# Patient Record
Sex: FEMALE | Race: BLACK OR AFRICAN AMERICAN | Employment: UNEMPLOYED | URBAN - METROPOLITAN AREA
[De-identification: names, ages, dates, MRNs, and addresses within clinical notes are randomized per-mention and may not be internally consistent; named-entity substitution may affect disease eponyms.]

---

## 2017-01-09 RX ORDER — METFORMIN HYDROCHLORIDE 500 MG/1
1000 TABLET ORAL 2 TIMES DAILY WITH MEALS
Qty: 120 TAB | Refills: 0 | Status: SHIPPED | OUTPATIENT
Start: 2017-01-09 | End: 2017-03-08 | Stop reason: SDUPTHER

## 2017-01-19 RX ORDER — COLCHICINE 0.6 MG/1
TABLET ORAL
Qty: 90 TAB | Refills: 1 | Status: SHIPPED | OUTPATIENT
Start: 2017-01-19 | End: 2017-02-07

## 2017-02-07 ENCOUNTER — HOSPITAL ENCOUNTER (OUTPATIENT)
Dept: LAB | Age: 57
Discharge: HOME | End: 2017-02-07

## 2017-02-07 ENCOUNTER — OFFICE VISIT (OUTPATIENT)
Dept: FAMILY MEDICINE CLINIC | Age: 57
End: 2017-02-07

## 2017-02-07 VITALS
BODY MASS INDEX: 48.82 KG/M2 | WEIGHT: 293 LBS | HEART RATE: 81 BPM | TEMPERATURE: 98.7 F | DIASTOLIC BLOOD PRESSURE: 70 MMHG | SYSTOLIC BLOOD PRESSURE: 126 MMHG | OXYGEN SATURATION: 99 % | HEIGHT: 65 IN | RESPIRATION RATE: 16 BRPM

## 2017-02-07 VITALS
HEIGHT: 65 IN | SYSTOLIC BLOOD PRESSURE: 126 MMHG | RESPIRATION RATE: 16 BRPM | HEART RATE: 81 BPM | DIASTOLIC BLOOD PRESSURE: 70 MMHG | TEMPERATURE: 98.7 F | WEIGHT: 293 LBS | BODY MASS INDEX: 48.82 KG/M2 | OXYGEN SATURATION: 99 %

## 2017-02-07 DIAGNOSIS — I50.9 CONGESTIVE HEART FAILURE, UNSPECIFIED CONGESTIVE HEART FAILURE CHRONICITY, UNSPECIFIED CONGESTIVE HEART FAILURE TYPE: ICD-10-CM

## 2017-02-07 DIAGNOSIS — Z23 ENCOUNTER FOR IMMUNIZATION: ICD-10-CM

## 2017-02-07 DIAGNOSIS — I10 ESSENTIAL HYPERTENSION: Primary | ICD-10-CM

## 2017-02-07 DIAGNOSIS — E11.51 CONTROLLED TYPE 2 DIABETES MELLITUS WITH DIABETIC PERIPHERAL ANGIOPATHY WITHOUT GANGRENE, WITH LONG-TERM CURRENT USE OF INSULIN (HCC): ICD-10-CM

## 2017-02-07 DIAGNOSIS — Z00.00 ROUTINE GENERAL MEDICAL EXAMINATION AT A HEALTH CARE FACILITY: ICD-10-CM

## 2017-02-07 DIAGNOSIS — Z79.4 CONTROLLED TYPE 2 DIABETES MELLITUS WITH DIABETIC PERIPHERAL ANGIOPATHY WITHOUT GANGRENE, WITH LONG-TERM CURRENT USE OF INSULIN (HCC): ICD-10-CM

## 2017-02-07 DIAGNOSIS — I73.9 PERIPHERAL VASCULAR DISEASE (HCC): ICD-10-CM

## 2017-02-07 DIAGNOSIS — Z12.11 SCREEN FOR COLON CANCER: ICD-10-CM

## 2017-02-07 DIAGNOSIS — E78.00 HYPERCHOLESTEROLEMIA: ICD-10-CM

## 2017-02-07 DIAGNOSIS — Z12.31 ENCOUNTER FOR SCREENING MAMMOGRAM FOR MALIGNANT NEOPLASM OF BREAST: ICD-10-CM

## 2017-02-07 DIAGNOSIS — Z11.59 NEED FOR HEPATITIS C SCREENING TEST: ICD-10-CM

## 2017-02-07 PROCEDURE — 99001 SPECIMEN HANDLING PT-LAB: CPT | Performed by: NURSE PRACTITIONER

## 2017-02-07 RX ORDER — GABAPENTIN 300 MG/1
300 CAPSULE ORAL 3 TIMES DAILY
COMMUNITY

## 2017-02-07 RX ORDER — TIMOLOL MALEATE 5 MG/ML
1 SOLUTION OPHTHALMIC DAILY
COMMUNITY

## 2017-02-07 NOTE — PATIENT INSTRUCTIONS
Medicare Wellness Visit, Female    The best way to live healthy is to have a healthy lifestyle by eating a well-balanced diet, exercising regularly, limiting alcohol and stopping smoking. Regular physical exams and screening tests are another way to keep healthy. Preventive exams provided by your health care provider can find health problems before they become diseases or illnesses. Preventive services including immunizations, screening tests, monitoring and exams can help you take care of your own health. All people over age 72 should have a pneumovax  and and a prevnar shot to prevent pneumonia. These are once in a lifetime unless you and your provider decide differently. All people over 65 should have a yearly flu shot and a tetanus vaccine every 10 years. A bone mass density to screen for osteoporosis or thinning of the bones should be done every 2 years after 65. Screening for diabetes mellitus with a blood sugar test should be done every year. Glaucoma is a disease of the eye due to increased ocular pressure that can lead to blindness and it should be done every year by an eye professional.    Cardiovascular screening tests that check for elevated lipids (fatty part of blood) which can lead to heart disease and strokes should be done every 5 years. Colorectal screening that evaluates for blood or polyps in your colon should be done yearly as a stool test or every five years as a flexible sigmoidoscope or every 10 years as a colonoscopy up to age 76. Breast cancer screening with a mammogram is recommended biennially  for women age 54-69. Screening for cervical cancer with a pap smear and pelvic exam is recommended for women after age 72 years every 2 years up to age 79 or when the provider and patient decide to stop. If there is a history of cervical abnormalities or other increased risk for cancer then the test is recommended yearly.     Hepatitis C screening is also recommended for anyone born between [facility] [place] [facility]. A shingles vaccine is also recommended once in a lifetime after age 61. Your Medicare Wellness Exam is recommended annually. Here is a list of your current Health Maintenance items with a due date:  Health Maintenance Due   Topic Date Due    Hepatitis C Test  1960 - ordered today ( 2/7/17)    Pneumococcal Vaccine (1 of 1 - PPSV23) 08/23/1979 - given today ( 2/7/17)    DTaP/Tdap/Td  (1 - Tdap) 08/23/1981 - given today ( 2/7/17)    Diabetic Foot Care  09/16/2015    Hemoglobin A1C    12/24/2015    Cervical Cancer Screening  05/05/2016 - pap to be scheduled with own GYN    Albumin Urine Test  06/24/2016    Cholesterol Test   06/24/2016    Stool testing for trace blood  06/24/2016 - ordered today (2/7/17)     Eye Exam  09/05/2016       Vaccine Information Statement    Pneumococcal Polysaccharide Vaccine: What You Need to Know    Many Vaccine Information Statements are available in Swedish and other languages. See www.immunize.org/vis. Hojas de información Sobre Vacunas están disponibles en español y en muchos otros idiomas. Visite [email or web address]. 1. Why get vaccinated? Vaccination can protect older adults (and some children and younger adults) from pneumococcal disease. Pneumococcal disease is caused by bacteria that can spread from person to person through close contact. It can cause ear infections, and it can also lead to more serious infections of the:   Lungs (pneumonia),   Blood (bacteremia), and   Covering of the brain and spinal cord (meningitis). Meningitis can cause deafness and brain damage, and it can be fatal.      Anyone can get pneumococcal disease, but children under 3years of age, people with certain medical conditions, adults over 72years of age, and cigarette smokers are at the highest risk. About 18,000 older adults die each year from pneumococcal disease in the [place].     Treatment of pneumococcal infections with penicillin and other drugs used to be more effective. But some strains of the disease have become resistant to these drugs. This makes prevention of the disease, through vaccination, even more important. 2. Pneumococcal polysaccharide vaccine (PPSV23)    Pneumococcal polysaccharide vaccine (PPSV23) protects against 23 types of pneumococcal bacteria. It will not prevent all pneumococcal disease. PPSV23 is recommended for:   All adults 72years of age and older,   Anyone 2 through 59years of age with certain long-term health problems,   Anyone 2 through 59years of age with a weakened immune system,   Adults 23 through 59years of age who smoke cigarettes or have asthma. Most people need only one dose of PPSV. A second dose is recommended for certain high-risk groups. People 72 and older should get a dose even if they have gotten one or more doses of the vaccine before they turned 65. Your healthcare provider can give you more information about these recommendations. Most healthy adults develop protection within 2 to 3 weeks of getting the shot. 3. Some people should not get this vaccine     Anyone who has had a life-threatening allergic reaction to PPSV should not get another dose.  Anyone who has a severe allergy to any component of PPSV should not receive it. Tell your provider if you have any severe allergies.  Anyone who is moderately or severely ill when the shot is scheduled may be asked to wait until they recover before getting the vaccine. Someone with a mild illness can usually be vaccinated.  Children less than 3years of age should not receive this vaccine.  There is no evidence that PPSV is harmful to either a pregnant woman or to her fetus. However, as a precaution, women who need the vaccine should be vaccinated before becoming pregnant, if possible.     4. Risks of a vaccine reaction    With any medicine, including vaccines, there is a chance of side effects. These are usually mild and go away on their own, but serious reactions are also possible. About half of people who get PPSV have mild side effects, such as redness or pain where the shot is given, which go away within about two days. Less than 1 out of 100 people develop a fever, muscle aches, or more severe local reactions. Problems that could happen after any vaccine:     People sometimes faint after a medical procedure, including vaccination. Sitting or lying down for about 15 minutes can help prevent fainting, and injuries caused by a fall. Tell your doctor if you feel dizzy, or have vision changes or ringing in the ears.  Some people get severe pain in the shoulder and have difficulty moving the arm where a shot was given. This happens very rarely.  Any medication can cause a severe allergic reaction. Such reactions from a vaccine are very rare, estimated at about 1 in a million doses, and would happen within a few minutes to a few hours after the vaccination. As with any medicine, there is a very remote chance of a vaccine causing a serious injury or death. The safety of vaccines is always being monitored. For more information, visit: www.cdc.gov/vaccinesafety/     5. What if there is a serious reaction? What should I look for? Look for anything that concerns you, such as signs of a severe allergic reaction, very high fever, or unusual behavior. Signs of a severe allergic reaction can include hives, swelling of the face and throat, difficulty breathing, a fast heartbeat, dizziness, and weakness. These would usually start a few minutes to a few hours after the vaccination. What should I do? If you think it is a severe allergic reaction or other emergency that cant wait, call 9-1-1 or get to the nearest hospital. Otherwise, call your doctor. Afterward, the reaction should be reported to the Vaccine Adverse Event Reporting System (VAERS).  Your doctor might file this report, or you can do it yourself through the [employer] web site at www.[employer]. [place] [address].gov, or by calling 6-925.618.5023. [employer] does not give medical advice. 6. How can I learn more?  Ask your doctor. He or she can give you the vaccine package insert or suggest other sources of information.  Call your local or state health department.  Contact the Centers for Disease Control and Prevention (CDC):  - Call 3-894.158.5335 (1-800-CDC-INFO) or  - Visit [facility]s website at [email or web address] 2015    [facility] for Disease Control and Prevention    Office Use Only    Vaccine Information Statement     Tdap (Tetanus, Diphtheria, Pertussis) Vaccine: What You Need to Know    Many Vaccine Information Statements are available in Bhutanese and other languages. See www.immunize.org/vis. Hojas de Información Sobre Vacunas están disponibles en español y en muchos otros idiomas. Visite [email or web address]    1. Why get vaccinated? Tetanus, diphtheria, and pertussis are very serious diseases. Tdap vaccine can protect us from these diseases. And, Tdap vaccine given to pregnant women can protect  babies against pertussis. TETANUS (Lockjaw) is rare in the [facility] today. It causes painful muscle tightening and stiffness, usually all over the body.  It can lead to tightening of muscles in the head and neck so you cant open your mouth, swallow, or sometimes even breathe. Tetanus kills about 1 out of 10 people who are infected even after receiving the best medical care. DIPHTHERIA is also rare in the [facility] today. It can cause a thick coating to form in the back of the throat.  It can lead to breathing problems, heart failure, paralysis, and death. PERTUSSIS (Whooping Cough) causes severe coughing spells, which can cause difficulty breathing, vomiting, and disturbed sleep.    It can also lead to weight loss, incontinence, and rib fractures. Up to 2 in 100 adolescents and 5 in 100 adults with pertussis are hospitalized or have complications, which could include pneumonia or death. These diseases are caused by bacteria. Diphtheria and pertussis are spread from person to person through secretions from coughing or sneezing. Tetanus enters the body through cuts, scratches, or wounds. Before vaccines, as many as 200,000 cases of diphtheria, 200,000 cases of pertussis, and hundreds of cases of tetanus, were reported in the [place] each year. Since vaccination began, reports of cases for tetanus and diphtheria have dropped by about 99% and for pertussis by about 80%. 2. Tdap vaccine    Tdap vaccine can protect adolescents and adults from tetanus, diphtheria, and pertussis. One dose of Tdap is routinely given at age 6 or 15. People who did not get Tdap at that age should get it as soon as possible. Tdap is especially important for health care professionals and anyone having close contact with a baby younger than 12 months. Pregnant women should get a dose of Tdap during every pregnancy, to protect the  from pertussis. Infants are most at risk for severe, life-threatening complications from pertussis. Another vaccine, called Td, protects against tetanus and diphtheria, but not pertussis. A Td booster should be given every 10 years. Tdap may be given as one of these boosters if you have never gotten Tdap before. Tdap may also be given after a severe cut or burn to prevent tetanus infection. Your doctor or the person giving you the vaccine can give you more information. Tdap may safely be given at the same time as other vaccines.     3. Some people should not get this vaccine     A person who has ever had a life-threatening allergic reaction after a previous dose of any diphtheria, tetanus or pertussis containing vaccine, OR has a severe allergy to any part of this vaccine, should not get Tdap vaccine. Tell the person giving the vaccine about any severe allergies.  Anyone who had coma or long repeated seizures within 7 days after a childhood dose of DTP or DTaP, or a previous dose of Tdap, should not get Tdap, unless a cause other than the vaccine was found. They can still get Td.  Talk to your doctor if you:  - have seizures or another nervous system problem,  - had severe pain or swelling after any vaccine containing diphtheria, tetanus or pertussis,   - ever had a condition called Guillain Barré Syndrome (GBS),  - arent feeling well on the day the shot is scheduled. 4. Risks    With any medicine, including vaccines, there is a chance of side effects. These are usually mild and go away on their own. Serious reactions are also possible but are rare. Most people who get Tdap vaccine do not have any problems with it.     Mild Problems following Tdap  (Did not interfere with activities)   Pain where the shot was given (about 3 in 4 adolescents or 2 in 3 adults)   Redness or swelling where the shot was given (about 1 person in 5)   Mild fever of at least 100.4°F (up to about 1 in 25 adolescents or 1 in 100 adults)   Headache (about 3 or 4 people in 10)   Tiredness (about 1 person in 3 or 4)   Nausea, vomiting, diarrhea, stomach ache (up to 1 in 4 adolescents or 1 in 10 adults)   Chills,  sore joints (about 1 person in 10)   Body aches (about 1 person in 3 or 4)    Rash, swollen glands (uncommon)    Moderate Problems following Tdap  (Interfered with activities, but did not require medical attention)   Pain where the shot was given (up to 1 in 5 or 6)    Redness or swelling where the shot was given (up to about 1 in 16 adolescents or 1 in 12 adults)   Fever over 102°F (about 1 in 100 adolescents or 1 in 250 adults)   Headache (about 1 in 7 adolescents or 1 in 10 adults)   Nausea, vomiting, diarrhea, stomach ache (up to 1 or 3 people in 100)   Swelling of the entire arm where the shot was given (up to about 1 in 500). Severe Problems following Tdap  (Unable to perform usual activities; required medical attention)   Swelling, severe pain, bleeding, and redness in the arm where the shot was given (rare). Problems that could happen after any vaccine:     People sometimes faint after a medical procedure, including vaccination. Sitting or lying down for about 15 minutes can help prevent fainting, and injuries caused by a fall. Tell your doctor if you feel dizzy, or have vision changes or ringing in the ears.  Some people get severe pain in the shoulder and have difficulty moving the arm where a shot was given. This happens very rarely.  Any medication can cause a severe allergic reaction. Such reactions from a vaccine are very rare, estimated at fewer than 1 in a million doses, and would happen within a few minutes to a few hours after the vaccination. As with any medicine, there is a very remote chance of a vaccine causing a serious injury or death. The safety of vaccines is always being monitored. For more information, visit: www.cdc.gov/vaccinesafety/    5. What if there is a serious problem? What should I look for?  Look for anything that concerns you, such as signs of a severe allergic reaction, very high fever, or unusual behavior.  Signs of a severe allergic reaction can include hives, swelling of the face and throat, difficulty breathing, a fast heartbeat, dizziness, and weakness. These would usually start a few minutes to a few hours after the vaccination. What should I do?  If you think it is a severe allergic reaction or other emergency that cant wait, call 9-1-1 or get the person to the nearest hospital. Otherwise, call your doctor.  Afterward, the reaction should be reported to the Vaccine Adverse Event Reporting System (VAERS).  Your doctor might file this report, or you can do it yourself through the VAERS web site at www.vaers. [facility].gov, or by calling 0-312.224.6330. VAERS does not give medical advice. 6. The National Vaccine Injury Compensation Program    The [facility] Vaccine Injury Compensation Program (VICP) is a federal program that was created to compensate people who may have been injured by certain vaccines. Persons who believe they may have been injured by a vaccine can learn about the program and about filing a claim by calling 3-860.900.6688 or visiting the [employer] website at www.[facility].gov/vaccinecompensation. There is a time limit to file a claim for compensation. 7. How can I learn more?  Ask your doctor. He or she can give you the vaccine package insert or suggest other sources of information.  Call your local or state health department.  Contact the Centers for Disease Control and Prevention (CDC):  - Call 6-276.910.1655 (1-800-CDC-INFO) or  - Visit CDCs website at www.cdc.gov/vaccines      Vaccine Information Statement   Tdap Vaccine  (2/24/2015)  [id number]    Department of Health and Human Services  Centers for Disease Control and Prevention    Office Use Only

## 2017-02-07 NOTE — PROGRESS NOTES
Matt Camejo is a 64 y.o. female presents to office for [address]       1. Have you been to the ER, urgent care clinic or hospitalized since your last visit? no  2. Have you seen any other providers outside of [facility] [employer] since your last visit? no  3. Have you had a Flu shot this year?  Yes        Health Maintenance items with a due date reviewed with patient:  Health Maintenance Due   Topic Date Due    Hepatitis C Screening  1960    Pneumococcal 19-64 Medium Risk (1 of 1 - PPSV23) 08/23/1979    DTaP/Tdap/Td series (1 - Tdap) 08/23/1981    FOOT EXAM Q1  09/16/2015    HEMOGLOBIN A1C Q6M  12/24/2015    PAP AKA CERVICAL CYTOLOGY  05/05/2016    MICROALBUMIN Q1  06/24/2016    LIPID PANEL Q1  06/24/2016    FOBT Q 1 YEAR AGE 50-75  06/24/2016    EYE EXAM RETINAL OR DILATED Q1  09/05/2016

## 2017-02-07 NOTE — PROGRESS NOTES
Barb Morelos is a 64 y.o. female presents to office for CAD, DM and PAD       1. Have you been to the ER, urgent care clinic or hospitalized since your last visit? No  2. Have you seen any other providers outside of [facility] since your last visit? No  3. Have you had a Flu shot this year?  Yes        Health Maintenance items with a due date reviewed with patient:  Health Maintenance Due   Topic Date Due    Hepatitis C Screening  1960    Pneumococcal 19-64 Medium Risk (1 of 1 - PPSV23) 08/23/1979    DTaP/Tdap/Td series (1 - Tdap) 08/23/1981    FOOT EXAM Q1  09/16/2015    HEMOGLOBIN A1C Q6M  12/24/2015    PAP AKA CERVICAL CYTOLOGY  05/05/2016    MICROALBUMIN Q1  06/24/2016    LIPID PANEL Q1  06/24/2016    FOBT Q 1 YEAR AGE 50-75  06/24/2016    INFLUENZA AGE 9 TO ADULT  08/01/2016    EYE EXAM RETINAL OR DILATED Q1  09/05/2016

## 2017-02-07 NOTE — PROGRESS NOTES
This is a Subsequent Medicare Annual Wellness Visit providing Personalized Prevention Plan Services (PPPS) (Performed 12 months after initial AWV and PPPS )    I have reviewed the patient's medical history in detail and updated the computerized patient record. Patient presents for medicare wellness visit. No significant complaints made. History     Past Medical History   Diagnosis Date    Anemia NEC     Arthritis     Chronic kidney disease     Congestive heart failure, unspecified      L[place]isha    CRI (chronic renal insufficiency)      Zelesky    Diabetes ([facility] [address]      eye- Lance Repress    Fibromyalgia     Gout     H/O colonoscopy     H/O mammogram      5/2013    History of blood transfusion     Hypercholesterolemia     Hypertension     PCOS (polycystic ovarian syndrome)     Peripheral vascular disease ([facility] [address]     Routine gynecological examination      Aaliyah [place]- May 2013    Stenosis of lumbosacral spine     Stroke [facility]) 1991     had dysarthria    Vitamin D deficiency       Past Surgical History   Procedure Laterality Date    Hx tonsillectomy  1981    Hx ptca  April 27, 2012    Stent insertion Right 1-     right iliac    Bryan us asp breast cyst rt       Current Outpatient Prescriptions   Medication Sig Dispense Refill    CARVEDILOL (COREG PO) Take 50 mg by mouth two (2) times a day.  Liraglutide (VICTOZA) 0.6 mg/0.1 mL (18 mg/3 mL) sub-q pen 1.8 mg by SubCUTAneous route.  gabapentin (NEURONTIN) 300 mg capsule Take 300 mg by mouth three (3) times daily.  timolol (TIMOPTIC-XE) 0.5 % ophthalmic gel-forming Administer 1 Drop to both eyes daily.  NIFEdipine ER (PROCARDIA XL) 90 mg ER tablet take 1 tablet by mouth once daily 30 Tab 0    metFORMIN (GLUCOPHAGE) 500 mg tablet Take 2 Tabs by mouth two (2) times daily (with meals).  120 Tab 0    simvastatin (ZOCOR) 40 mg tablet take 1 tablet by mouth NIGHTLY 90 Tab 1    clopidogrel (PLAVIX) 75 mg tab take 1 tablet by mouth once daily 90 Tab 1    losartan (COZAAR) 100 mg tablet take 1 tablet by mouth once daily 90 Tab 0    spironolactone (ALDACTONE) 25 mg tablet take 1 tablet by mouth once daily 90 Tab 1    allopurinol (ZYLOPRIM) 300 mg tablet take 1 tablet by mouth once daily 90 Tab 0    INVOKANA 300 mg tablet take 1 tablet by mouth once daily BEFORE BREAKFAST 30 Tab 3    LANTUS SOLOSTAR 100 unit/mL (3 mL) pen INJECT 30 UNITS SUBCUTANEOUSLY ONCE A DAY 5 Each 6    DULoxetine (CYMBALTA) 60 mg capsule take 1 capsule by mouth once daily 90 Cap 1    furosemide (LASIX) 20 mg tablet take 1 tablet by mouth once daily 90 Tab 1    BD INSULIN PEN NEEDLE UF SHORT 31 gauge x 5/16\" ndle USE TO INJECT ONE TIME DAILY WITH INSULIN PEN 90 Package 0    BD SINGLE USE SWABS REGULAR padm USE AS DIRECTED 300 Pad 2    isosorbide mononitrate ER (IMDUR) 60 mg CR tablet take 1 tablet by mouth every morning 30 Tab 0    Lancets (ACCU-CHEK MULTICLIX LANCET) misc USE TO TEST BLOOD GLUCOSE FOUR TIMES DAILY 400 Each 1    glucose blood VI test strips (ACCU-CHEK SMARTVIEW TEST STRIP) strip USE TO TEST BLOOD SUGAR FOUR TIMES DAILY 300 Strip 0    aspirin (BABY ASPIRIN) 81 mg chewable tablet Take 1 tablet by mouth daily. 90 tablet 3    Lancets (PRODIGY LANCETS) misc Tests qid 250.02 3 Package 3    multivitamin (ONE A DAY) tablet Take 1 tablet by mouth daily.        Allergies   Allergen Reactions    Other Plant, Animal, Environmental Runny Nose and Sneezing     Family History   Problem Relation Age of Onset    Diabetes Mother     Hypertension Mother     Dementia Mother     Other Mother      aneurysm     Cancer Father      LUNG    Colon Cancer Sister          Diabetes Sister     Hypertension Sister     Diabetes Brother     Hypertension Brother      Social History   Substance Use Topics    Smoking status: Former Smoker     Packs/day: 1.00     Years: 7.00     Types: Cigarettes     Quit date: 10/1/1981    Smokeless tobacco: Never Used   [employer] [place] Alcohol use No     Patient Active Problem List   Diagnosis Code    CRI (chronic renal insufficiency) N18.9    Hypertension I10    Congestive heart failure, unspecified I50.9    Anemia NEC     PCOS (polycystic ovarian syndrome) E28.2    Arthritis M19.90    Stenosis of lumbosacral spine M48.07    Peripheral vascular disease (HCC) I73.9    Hypercholesterolemia E78.00    Type II or unspecified type diabetes mellitus without mention of complication, uncontrolled E11.65    Gout M10.9    Fibromyalgia M79.7    DJD (degenerative joint disease) of knee M17.9    Tachycardia R00.0    CHF (congestive heart failure) (HCC) I50.9       Depression Risk Factor Screening:     PHQ 2 / 9, over the last two weeks 2/7/2017   Little interest or pleasure in doing things Not at all   Feeling down, depressed or hopeless Not at all   Total Score PHQ 2 0     Alcohol Risk Factor Screening: On any occasion during the past 3 months, have you had more than 3 drinks containing alcohol? No    Do you average more than 7 drinks per week? No      Functional Ability and Level of Safety:     Hearing Loss   none    Activities of Daily Living   Self-care. Requires assistance with: no ADLs    Fall Risk     Fall Risk Assessment, last 12 mths 2/7/2017   Able to walk? Yes   Fall in past 12 months? No   Fall with injury? -   Number of falls in past 12 months -   Fall Risk Score -     Abuse Screen   Patient is not abused    Review of Systems   A comprehensive review of systems was negative except for that written in the HPI. Physical Examination     Evaluation of Cognitive Function:  Mood/affect:  happy  Appearance: age appropriate  Family member/caregiver input: none    No exam performed today, MWV.     Patient Care Team:  Erin Traore MD as PCP - General (Internal Medicine)   Mike Grewal MD - rheumatology  Fili Renteria MD - ophthalmology  Wilman Payton MD - cardiology  [facility] Vascular Specialist        Advice/Referrals/Counseling   Education and counseling provided:  Pneumococcal Vaccine   Tdap Vaccine  Heapatitis C Screening  Colon Cancer Screening  Breast Cancer Screening      Pap due, preferred to go to OB-Gyn  Colonoscopy not due until 2018  Fecal Occult Blood - last done on 6/24/15  Last mammogram -9/2/15    Assessment/Plan       ICD-10-CM ICD-9-CM    1. Routine general medical examination at a health care facility Z00.00 V70.0        2. Need for hepatitis C screening test Z11.59 V73.89 HEPATITIS C AB     3. Screen for colon cancer Z12.11 V76.51 OCCULT BLOOD, IMMUNOASSAY (FIT)     4. Encounter for immunization Z23 V03.89 ADMIN PNEUMOCOCCAL VACCINE      PNEUMOCOCCAL POLYSACCHARIDE VACCINE, 23-VALENT, ADULT OR IMMUNOSUPPRESSED PT DOSE,        TETANUS, DIPHTHERIA TOXOIDS AND ACELLULAR PERTUSSIS VACCINE (TDAP), IN INDIVIDS. >=7, IM     5. Encounter for screening mammogram for malignant neoplasm of breast Z12.31 V76.12 SKYLA MAMMO BI SCREENING INCL CAD       Medicare Wellness Visit Schedule discussed. Follow-up Disposition:  Return in about 1 year (around 2/7/2018) for subsequent MWV. Dylon Johnson, ANP-BC  Adult Medicine  [facility]

## 2017-02-07 NOTE — PROGRESS NOTES
HISTORY OF PRESENT ILLNESS  Comfort Keller is a 64 y.o. female here for follow-up on diabetes hypertension hyperlipidemia CHF and PAD. Patient also has history of gout but now is off of colchicine and has had no further episodes. Patient has had another stent placed in right leg for PAD since last seen. Fasting blood sugars are in the 90s-118 and postprandial blood sugars are 140-180. Overall patient is doing well and has no complaints today  CHF   The history is provided by the patient and medical records. This is a chronic problem. The problem has been gradually improving. Pertinent negatives include no chest pain, no abdominal pain, no headaches and no shortness of breath. Nothing aggravates the symptoms. The symptoms are relieved by medications. Treatments tried: Coreg and Lasix. The treatment provided significant relief. Diabetes   The history is provided by the patient and medical records. This is a chronic problem. The problem has been gradually improving. Pertinent negatives include no chest pain, no abdominal pain, no headaches and no shortness of breath. The symptoms are aggravated by eating. The symptoms are relieved by medications. Treatments tried: Victoza metformin and Invokana. The treatment provided significant relief. Other   The history is provided by the patient and medical records (Patient has history of PAD). This is a chronic problem. The problem has been gradually improving. Pertinent negatives include no chest pain, no abdominal pain, no headaches and no shortness of breath. Nothing aggravates the symptoms. Relieved by: Stent placement. Treatments tried: Stent placement. The treatment provided significant relief. Cholesterol Problem   The history is provided by the patient and medical records. This is a chronic problem. The problem has been gradually improving. Pertinent negatives include no chest pain, no abdominal pain, no headaches and no shortness of breath.  The symptoms are aggravated by eating. The symptoms are relieved by medications. Treatments tried: Zocor. The treatment provided significant relief. Hypertension    The history is provided by the patient and medical records. This is a chronic problem. The problem has been gradually improving. Pertinent negatives include no chest pain, no orthopnea, no headaches, no peripheral edema and no shortness of breath. Risk factors include dyslipidemia, diabetes mellitus, obesity and postmenopause. Allergies   Allergen Reactions    Other Plant, Animal, Environmental Runny Nose and Sneezing     Current Outpatient Prescriptions on File Prior to Visit   Medication Sig Dispense Refill    NIFEdipine ER (PROCARDIA XL) 90 mg ER tablet take 1 tablet by mouth once daily 30 Tab 0    metFORMIN (GLUCOPHAGE) 500 mg tablet Take 2 Tabs by mouth two (2) times daily (with meals).  120 Tab 0    simvastatin (ZOCOR) 40 mg tablet take 1 tablet by mouth NIGHTLY 90 Tab 1    clopidogrel (PLAVIX) 75 mg tab take 1 tablet by mouth once daily 90 Tab 1    losartan (COZAAR) 100 mg tablet take 1 tablet by mouth once daily 90 Tab 0    spironolactone (ALDACTONE) 25 mg tablet take 1 tablet by mouth once daily 90 Tab 1    allopurinol (ZYLOPRIM) 300 mg tablet take 1 tablet by mouth once daily 90 Tab 0    INVOKANA 300 mg tablet take 1 tablet by mouth once daily BEFORE BREAKFAST 30 Tab 3    LANTUS SOLOSTAR 100 unit/mL (3 mL) pen INJECT 30 UNITS SUBCUTANEOUSLY ONCE A DAY 5 Each 6    DULoxetine (CYMBALTA) 60 mg capsule take 1 capsule by mouth once daily 90 Cap 1    furosemide (LASIX) 20 mg tablet take 1 tablet by mouth once daily 90 Tab 1    BD INSULIN PEN NEEDLE UF SHORT 31 gauge x 5/16\" ndle USE TO INJECT ONE TIME DAILY WITH INSULIN PEN 90 Package 0    BD SINGLE USE SWABS REGULAR padm USE AS DIRECTED 300 Pad 2    isosorbide mononitrate ER (IMDUR) 60 mg CR tablet take 1 tablet by mouth every morning 30 Tab 0    Lancets (ACCU-CHEK MULTICLIX LANCET) misc USE TO TEST BLOOD GLUCOSE FOUR TIMES DAILY 400 Each 1    glucose blood VI test strips (ACCU-CHEK SMARTVIEW TEST STRIP) strip USE TO TEST BLOOD SUGAR FOUR TIMES DAILY 300 Strip 0    aspirin (BABY ASPIRIN) 81 mg chewable tablet Take 1 tablet by mouth daily. 90 tablet 3    Lancets (PRODIGY LANCETS) misc Tests qid 250.02 3 Package 3    multivitamin (ONE A DAY) tablet Take 1 tablet by mouth daily. No current facility-administered medications on file prior to visit. Past Medical History   Diagnosis Date    Anemia NEC     Arthritis     Chronic kidney disease     Congestive heart failure, unspecified      [place]isha    CRI (chronic renal insufficiency)      Zelesky    Diabetes ([facility] [address]      eye- Joel Nestor    Fibromyalgia     Gout     H/O colonoscopy     H/O mammogram      2013    History of blood transfusion     Hypercholesterolemia     Hypertension     PCOS (polycystic ovarian syndrome)     Peripheral vascular disease ([facility] [address]     Routine gynecological examination      [facility]- [facility] 2013    Stenosis of lumbosacral spine     Stroke [facility]) 1991     had dysarthria    Vitamin D deficiency      Past Surgical History   Procedure Laterality Date    Hx tonsillectomy      Hx ptca  2012    Stent insertion Right 2012     right iliac    Bryan us asp breast cyst rt       Family History   Problem Relation Age of Onset    Diabetes Mother     Hypertension Mother     Dementia Mother     Other Mother      aneurysm     Cancer Father      LUNG    Colon Cancer Sister          Diabetes Sister     Hypertension Sister     Diabetes Brother     Hypertension Brother      Social History     Social History    Marital status: SINGLE     Spouse name: N/A    Number of children: N/A    Years of education: N/A     Occupational History    Not on file.      Social History Main Topics    Smoking status: Former Smoker     Packs/day: 1.00     Years: 7.00     Types: Cigarettes     Quit date: 10/1/1981    Smokeless tobacco: Never Used    Alcohol use No    Drug use: No    Sexual activity: Not Currently     Other Topics Concern     Service No    Blood Transfusions Yes     2010    Caffeine Concern No    Occupational Exposure No    Hobby Hazards No    Sleep Concern No    Stress Concern No    Weight Concern Yes     WOULD LIKE TO LOSE WEIGHT AND MAINTAIN WEIGHT LOSS    Special Diet No    Back Care No    Exercise Yes     SOMETIMES    Bike Helmet No    Seat Belt Yes    Self-Exams Yes     Social History Narrative         Review of Systems   Constitutional: Negative. Respiratory: Negative. Negative for shortness of breath. Cardiovascular: Negative. Negative for chest pain and orthopnea. Gastrointestinal: Negative for abdominal pain. Musculoskeletal: Negative. Neurological: Negative. Negative for headaches. Endo/Heme/Allergies: Negative. Psychiatric/Behavioral: Negative. Visit Vitals    /70 (BP 1 Location: Left arm, BP Patient Position: Sitting)    Pulse 81    Temp 98.7 °F (37.1 °C) (Oral)    Resp 16    Ht 5' 4.5\" (1.638 m)    Wt 321 lb (145.6 kg)    LMP 01/04/2017    SpO2 99%    BMI 54.25 kg/m2       Physical Exam   Constitutional: She is oriented to person, place, and time. She appears well-developed and well-nourished. HENT:   Head: Normocephalic and atraumatic. Cardiovascular: Normal rate, regular rhythm, normal heart sounds and intact distal pulses. Exam reveals no gallop and no friction rub. No murmur heard. Pulmonary/Chest: Effort normal and breath sounds normal. No respiratory distress. She has no wheezes. She has no rales. Musculoskeletal: She exhibits no edema. Neurological: She is alert and oriented to person, place, and time. No cranial nerve deficit. Psychiatric: She has a normal mood and affect. Her behavior is normal. Judgment and thought content normal.   Nursing note and vitals reviewed.       ASSESSMENT and PLAN ICD-10-CM ICD-9-CM    1. Essential hypertension U05 152.4 METABOLIC PANEL, COMPREHENSIVE      URINALYSIS W/ RFLX MICROSCOPIC   2. Congestive heart failure, unspecified congestive heart failure chronicity, unspecified congestive heart failure type ([facility]) U74.3 014.7 METABOLIC PANEL, COMPREHENSIVE   3. Controlled type 2 diabetes mellitus with diabetic peripheral angiopathy without gangrene, with long-term current use of insulin ([facility]) E11.51 250.70 HEMOGLOBIN A1C WITH EAG    Z79.4 443.81 CBC WITH AUTOMATED DIFF     T11.59 METABOLIC PANEL, COMPREHENSIVE      MICROALBUMIN, UR, RAND W/ MICROALBUMIN/CREA RATIO      URINALYSIS W/ RFLX MICROSCOPIC   4. Hypercholesterolemia M17.14 049.9 METABOLIC PANEL, COMPREHENSIVE      LIPID PANEL   5. Peripheral vascular disease (HCC) I73.9 443.9 LIPID PANEL     Follow-up Disposition:  Return in about 4 weeks (around 3/7/2017).   current treatment plan is effective, no change in therapy

## 2017-02-07 NOTE — MR AVS SNAPSHOT
Visit Information Date & Time Provider Department Dept. Phone Encounter #  
 2/7/2017  8:45 AM Luz Valle MD [facility] [phone] [id number] Follow-up Instructions Return in about 4 weeks (around 3/7/2017). Upcoming Health Maintenance Date Due Hepatitis C Screening 1960 FOOT EXAM Q1 9/16/2015 HEMOGLOBIN A1C Q6M 12/24/2015 PAP AKA CERVICAL CYTOLOGY 5/5/2016 MICROALBUMIN Q1 6/24/2016 LIPID PANEL Q1 6/24/2016 FOBT Q 1 YEAR AGE 50-75 6/24/2016 EYE EXAM RETINAL OR DILATED Q1 9/5/2016 BREAST CANCER SCRN MAMMOGRAM 9/2/2017 DTaP/Tdap/Td series (2 - Td) 2/7/2027 Allergies as of 2/7/2017  Review Complete On: 2/7/2017 By: JOSH Atkins Severity Noted Reaction Type Reactions Other Plant, Animal, Environmental  06/24/2015    Runny Nose, Sneezing Current Immunizations  Reviewed on 2/7/2017 Name Date Influenza Vaccine 9/16/2014 11:53 AM, 12/30/2013  8:50 AM, 11/10/2011, 10/12/2010 Pneumococcal Polysaccharide (PPSV-23) 2/7/2017, 10/12/2010 Tdap 2/7/2017 Reviewed by JOSH Atkins on 2/7/2017 at  9:33 AM  
You Were Diagnosed With   
  
 Codes Comments Essential hypertension    -  Primary ICD-10-CM: I10 
ICD-9-CM: 401.9 Congestive heart failure, unspecified congestive heart failure chronicity, unspecified congestive heart failure type ([facility]     ICD-10-CM: I50.9 ICD-9-CM: 428.0 Controlled type 2 diabetes mellitus with diabetic peripheral angiopathy without gangrene, with long-term current use of insulin (HCC)     ICD-10-CM: E11.51, Z79.4 ICD-9-CM: 250.70, 443.81, V58.67 Hypercholesterolemia     ICD-10-CM: E78.00 ICD-9-CM: 272.0 Peripheral vascular disease ([facility]     ICD-10-CM: I73.9 ICD-9-CM: 443. 9 Vitals BP Pulse Temp Resp Height(growth percentile) Weight(growth percentile) 126/70 (BP 1 Location: Left arm, BP Patient Position: Sitting) 81 98.7 °F (37.1 °C) (Oral) 16 5' 4.5\" (1.638 m) 321 lb (145.6 kg) LMP SpO2 BMI OB Status Smoking Status 01/04/2017 99% 54.25 kg/m2 Having regular periods Former Smoker Vitals History BMI and BSA Data Body Mass Index Body Surface Area  
 54.25 kg/m 2 2.57 m 2 Preferred Pharmacy Pharmacy Name Phone [id number] [address] [phone] Your Updated Medication List  
  
   
This list is accurate as of: 2/7/17 10:23 AM.  Always use your most recent med list.  
  
  
  
  
 allopurinol 300 mg tablet Commonly known as:  ZYLOPRIM  
take 1 tablet by mouth once daily  
  
 aspirin 81 mg chewable tablet Commonly known as:  BABY ASPIRIN Take 1 tablet by mouth daily. BD INSULIN PEN NEEDLE UF SHORT 31 gauge x 5/16\" Ndle Generic drug:  Insulin Needles (Disposable) USE TO INJECT ONE TIME DAILY WITH INSULIN PEN  
  
 BD Single Use Swabs Regular Padm Generic drug:  alcohol swabs USE AS DIRECTED  
  
 clopidogrel 75 mg Tab Commonly known as:  PLAVIX  
take 1 tablet by mouth once daily COREG PO Take 50 mg by mouth two (2) times a day. DULoxetine 60 mg capsule Commonly known as:  CYMBALTA  
take 1 capsule by mouth once daily  
  
 furosemide 20 mg tablet Commonly known as:  LASIX  
take 1 tablet by mouth once daily  
  
 gabapentin 300 mg capsule Commonly known as:  NEURONTIN Take 300 mg by mouth three (3) times daily. glucose blood VI test strips strip Commonly known as:  ACCU-CHEK SMARTVIEW TEST STRIP  
USE TO TEST BLOOD SUGAR FOUR TIMES DAILY INVOKANA 300 mg tablet Generic drug:  canagliflozin  
take 1 tablet by mouth once daily BEFORE BREAKFAST  
  
 isosorbide mononitrate ER 60 mg CR tablet Commonly known as:  IMDUR  
take 1 tablet by mouth every morning * Lancets Misc Commonly known as:  PRODIGY LANCETS Tests qid 250.02 * Lancets Misc Commonly known as:  ACCU-CHEK MULTICLIX LANCET  
USE TO TEST BLOOD GLUCOSE FOUR TIMES DAILY  
  
 LANTUS SOLOSTAR 100 unit/mL (3 mL) pen Generic drug:  insulin glargine INJECT 30 UNITS SUBCUTANEOUSLY ONCE A DAY Liraglutide 0.6 mg/0.1 mL (18 mg/3 mL) sub-q pen Commonly known as:  VICTOZA  
1.8 mg by SubCUTAneous route. losartan 100 mg tablet Commonly known as:  COZAAR  
take 1 tablet by mouth once daily  
  
 metFORMIN 500 mg tablet Commonly known as:  GLUCOPHAGE Take 2 Tabs by mouth two (2) times daily (with meals). multivitamin tablet Commonly known as:  ONE A DAY Take 1 tablet by mouth daily. NIFEdipine ER 90 mg ER tablet Commonly known as:  PROCARDIA XL  
take 1 tablet by mouth once daily  
  
 simvastatin 40 mg tablet Commonly known as:  ZOCOR  
take 1 tablet by mouth NIGHTLY  
  
 spironolactone 25 mg tablet Commonly known as:  ALDACTONE  
take 1 tablet by mouth once daily  
  
 timolol 0.5 % ophthalmic gel-forming Commonly known as:  TIMOPTIC-XE Administer 1 Drop to both eyes daily. * Notice: This list has 2 medication(s) that are the same as other medications prescribed for you. Read the directions carefully, and ask your doctor or other care provider to review them with you. Follow-up Instructions Return in about 4 weeks (around 3/7/2017). To-Do List   
 02/07/2017 Lab:  CBC WITH AUTOMATED DIFF   
  
 02/07/2017 Lab:  HEMOGLOBIN A1C WITH EAG   
  
 02/07/2017 Lab:  LIPID PANEL   
  
 02/07/2017 Lab:  METABOLIC PANEL, COMPREHENSIVE   
  
 02/07/2017 Lab:  MICROALBUMIN, UR, RAND W/ MICROALBUMIN/CREA RATIO   
  
 02/07/2017 Lab:  URINALYSIS W/ RFLX MICROSCOPIC Introducing [address]! Dear Jessica Askew: Thank you for requesting a [facility] account.   Our records indicate that you already have an active [employer] account. You can access your account anytime at [email or web address] Did you know that you can access your hospital and ER discharge instructions at any time in [employer]? You can also review all of your test results from your hospital stay or ER visit. Additional Information If you have questions, please visit the Frequently Asked Questions section of the [employer] website at [email or web address] Remember, [employer] is NOT to be used for urgent needs. For medical emergencies, dial 911. Now available from your iPhone and Android! Please provide this summary of care documentation to your next provider. Your primary care clinician is listed as Natalio Rocha. If you have any questions after today's visit, please call [phone].

## 2017-02-08 LAB
ALBUMIN SERPL-MCNC: 4 G/DL (ref 3.5–5.5)
ALBUMIN/CREAT UR: 141.6 MG/G CREAT (ref 0–30)
ALBUMIN/GLOB SERPL: 1.5 {RATIO} (ref 1.1–2.5)
ALP SERPL-CCNC: 55 IU/L (ref 39–117)
ALT SERPL-CCNC: 16 IU/L (ref 0–32)
APPEARANCE UR: ABNORMAL
AST SERPL-CCNC: 15 IU/L (ref 0–40)
BACTERIA #/AREA URNS HPF: ABNORMAL /[HPF]
BASOPHILS # BLD AUTO: 0 X10E3/UL (ref 0–0.2)
BASOPHILS NFR BLD AUTO: 0 %
BILIRUB SERPL-MCNC: 0.3 MG/DL (ref 0–1.2)
BILIRUB UR QL STRIP: NEGATIVE
BUN SERPL-MCNC: 19 MG/DL (ref 6–24)
BUN/CREAT SERPL: 19 (ref 9–23)
CALCIUM SERPL-MCNC: 9.5 MG/DL (ref 8.7–10.2)
CASTS URNS MICRO: ABNORMAL
CASTS URNS QL MICRO: PRESENT /LPF
CHLORIDE SERPL-SCNC: 103 MMOL/L (ref 96–106)
CHOLEST SERPL-MCNC: 115 MG/DL (ref 100–199)
CO2 SERPL-SCNC: 23 MMOL/L (ref 18–29)
COLOR UR: YELLOW
CREAT SERPL-MCNC: 1.02 MG/DL (ref 0.57–1)
CREAT UR-MCNC: 219.7 MG/DL
EOSINOPHIL # BLD AUTO: 0.3 X10E3/UL (ref 0–0.4)
EOSINOPHIL NFR BLD AUTO: 3 %
EPI CELLS #/AREA URNS HPF: ABNORMAL /HPF
ERYTHROCYTE [DISTWIDTH] IN BLOOD BY AUTOMATED COUNT: 18.9 % (ref 12.3–15.4)
EST. AVERAGE GLUCOSE BLD GHB EST-MCNC: 143 MG/DL
GLOBULIN SER CALC-MCNC: 2.6 G/DL (ref 1.5–4.5)
GLUCOSE SERPL-MCNC: 116 MG/DL (ref 65–99)
GLUCOSE UR QL: ABNORMAL
HBA1C MFR BLD: 6.6 % (ref 4.8–5.6)
HCT VFR BLD AUTO: 34.7 % (ref 34–46.6)
HCV AB S/CO SERPL IA: <0.1 S/CO RATIO (ref 0–0.9)
HDLC SERPL-MCNC: 43 MG/DL
HGB BLD-MCNC: 10 G/DL (ref 11.1–15.9)
HGB UR QL STRIP: ABNORMAL
IMM GRANULOCYTES # BLD: 0 X10E3/UL (ref 0–0.1)
IMM GRANULOCYTES NFR BLD: 0 %
INTERPRETATION, 910389: NORMAL
KETONES UR QL STRIP: NEGATIVE
LDLC SERPL CALC-MCNC: 47 MG/DL (ref 0–99)
LEUKOCYTE ESTERASE UR QL STRIP: ABNORMAL
LYMPHOCYTES # BLD AUTO: 4.1 X10E3/UL (ref 0.7–3.1)
LYMPHOCYTES NFR BLD AUTO: 38 %
MCH RBC QN AUTO: 20 PG (ref 26.6–33)
MCHC RBC AUTO-ENTMCNC: 28.8 G/DL (ref 31.5–35.7)
MCV RBC AUTO: 70 FL (ref 79–97)
MICRO URNS: ABNORMAL
MICROALBUMIN UR-MCNC: 311.2 UG/ML
MONOCYTES # BLD AUTO: 0.4 X10E3/UL (ref 0.1–0.9)
MONOCYTES NFR BLD AUTO: 4 %
MUCOUS THREADS URNS QL MICRO: PRESENT
NEUTROPHILS # BLD AUTO: 5.9 X10E3/UL (ref 1.4–7)
NEUTROPHILS NFR BLD AUTO: 55 %
NITRITE UR QL STRIP: NEGATIVE
PH UR STRIP: 5.5 [PH] (ref 5–7.5)
PLATELET # BLD AUTO: 350 X10E3/UL (ref 150–379)
POTASSIUM SERPL-SCNC: 4.9 MMOL/L (ref 3.5–5.2)
PROT SERPL-MCNC: 6.6 G/DL (ref 6–8.5)
PROT UR QL STRIP: ABNORMAL
RBC # BLD AUTO: 4.99 X10E6/UL (ref 3.77–5.28)
RBC #/AREA URNS HPF: ABNORMAL /HPF
SODIUM SERPL-SCNC: 143 MMOL/L (ref 134–144)
SP GR UR: 1.03 (ref 1–1.03)
TRIGL SERPL-MCNC: 125 MG/DL (ref 0–149)
UROBILINOGEN UR STRIP-MCNC: 0.2 MG/DL (ref 0.2–1)
VLDLC SERPL CALC-MCNC: 25 MG/DL (ref 5–40)
WBC # BLD AUTO: 10.8 X10E3/UL (ref 3.4–10.8)
WBC #/AREA URNS HPF: >30 /HPF

## 2017-02-11 LAB — HEMOCCULT STL QL IA: NEGATIVE

## 2017-02-20 DIAGNOSIS — M10.9 GOUT, UNSPECIFIED CAUSE, UNSPECIFIED CHRONICITY, UNSPECIFIED SITE: ICD-10-CM

## 2017-02-20 RX ORDER — ALLOPURINOL 300 MG/1
TABLET ORAL
Qty: 90 TAB | Refills: 0 | Status: SHIPPED | OUTPATIENT
Start: 2017-02-20 | End: 2018-03-07 | Stop reason: SDUPTHER

## 2017-03-08 RX ORDER — METFORMIN HYDROCHLORIDE 500 MG/1
TABLET ORAL
Qty: 120 TAB | Refills: 0 | Status: SHIPPED | OUTPATIENT
Start: 2017-03-08 | End: 2017-04-17 | Stop reason: SDUPTHER

## 2017-03-16 RX ORDER — FUROSEMIDE 20 MG/1
TABLET ORAL
Qty: 90 TAB | Refills: 1 | Status: SHIPPED | OUTPATIENT
Start: 2017-03-16 | End: 2017-12-25 | Stop reason: SDUPTHER

## 2017-03-28 RX ORDER — BLOOD-GLUCOSE METER
EACH MISCELLANEOUS
Qty: 1 EACH | Refills: 0 | Status: SHIPPED | OUTPATIENT
Start: 2017-03-28

## 2017-03-28 RX ORDER — NIFEDIPINE 90 MG/1
TABLET, EXTENDED RELEASE ORAL
Qty: 30 TAB | Refills: 0 | Status: SHIPPED | OUTPATIENT
Start: 2017-03-28 | End: 2017-04-28 | Stop reason: SDUPTHER

## 2017-03-28 RX ORDER — LOSARTAN POTASSIUM 100 MG/1
TABLET ORAL
Qty: 90 TAB | Refills: 0 | Status: SHIPPED | OUTPATIENT
Start: 2017-03-28 | End: 2017-07-06 | Stop reason: SDUPTHER

## 2017-04-19 RX ORDER — METFORMIN HYDROCHLORIDE 500 MG/1
TABLET ORAL
Qty: 120 TAB | Refills: 0 | Status: SHIPPED | OUTPATIENT
Start: 2017-04-19 | End: 2017-06-26 | Stop reason: SDUPTHER

## 2017-04-19 RX ORDER — METFORMIN HYDROCHLORIDE 500 MG/1
TABLET ORAL
Qty: 120 TAB | Refills: 0 | Status: SHIPPED | OUTPATIENT
Start: 2017-04-19 | End: 2017-07-05 | Stop reason: SDUPTHER

## 2017-04-25 RX ORDER — ISOSORBIDE MONONITRATE 60 MG/1
TABLET, EXTENDED RELEASE ORAL
Qty: 30 TAB | Refills: 6 | Status: SHIPPED | OUTPATIENT
Start: 2017-04-25 | End: 2017-07-05 | Stop reason: SDUPTHER

## 2017-04-30 RX ORDER — NIFEDIPINE 90 MG/1
TABLET, EXTENDED RELEASE ORAL
Qty: 30 TAB | Refills: 0 | Status: SHIPPED | OUTPATIENT
Start: 2017-04-30 | End: 2017-06-01 | Stop reason: SDUPTHER

## 2017-05-10 RX ORDER — LIRAGLUTIDE 6 MG/ML
INJECTION SUBCUTANEOUS
Qty: 3 EACH | Refills: 3 | Status: SHIPPED | OUTPATIENT
Start: 2017-05-10 | End: 2017-07-05 | Stop reason: SDUPTHER

## 2017-05-25 RX ORDER — CANAGLIFLOZIN 300 MG/1
TABLET, FILM COATED ORAL
Qty: 30 TAB | Refills: 3 | Status: SHIPPED | OUTPATIENT
Start: 2017-05-25

## 2017-05-25 RX ORDER — DULOXETIN HYDROCHLORIDE 60 MG/1
CAPSULE, DELAYED RELEASE ORAL
Qty: 90 CAP | Refills: 1 | Status: SHIPPED | OUTPATIENT
Start: 2017-05-25 | End: 2017-10-12 | Stop reason: SDUPTHER

## 2017-06-02 RX ORDER — NIFEDIPINE 90 MG/1
TABLET, EXTENDED RELEASE ORAL
Qty: 30 TAB | Refills: 0 | Status: SHIPPED | OUTPATIENT
Start: 2017-06-02 | End: 2017-07-06 | Stop reason: SDUPTHER

## 2017-06-22 DIAGNOSIS — M10.9 GOUT, UNSPECIFIED CAUSE, UNSPECIFIED CHRONICITY, UNSPECIFIED SITE: ICD-10-CM

## 2017-06-27 RX ORDER — ALLOPURINOL 300 MG/1
TABLET ORAL
Qty: 90 TAB | Refills: 0 | Status: SHIPPED | OUTPATIENT
Start: 2017-06-27 | End: 2017-07-05 | Stop reason: SDUPTHER

## 2017-06-27 RX ORDER — METFORMIN HYDROCHLORIDE 500 MG/1
500 TABLET ORAL 2 TIMES DAILY WITH MEALS
Qty: 60 TAB | Refills: 0 | Status: SHIPPED | OUTPATIENT
Start: 2017-06-27 | End: 2017-07-26 | Stop reason: SDUPTHER

## 2017-07-05 ENCOUNTER — OFFICE VISIT (OUTPATIENT)
Dept: FAMILY MEDICINE CLINIC | Age: 57
End: 2017-07-05

## 2017-07-05 VITALS
BODY MASS INDEX: 48.82 KG/M2 | TEMPERATURE: 99 F | RESPIRATION RATE: 18 BRPM | SYSTOLIC BLOOD PRESSURE: 128 MMHG | DIASTOLIC BLOOD PRESSURE: 60 MMHG | WEIGHT: 293 LBS | HEART RATE: 96 BPM | OXYGEN SATURATION: 98 % | HEIGHT: 65 IN

## 2017-07-05 DIAGNOSIS — E78.00 HYPERCHOLESTEROLEMIA: ICD-10-CM

## 2017-07-05 DIAGNOSIS — D64.9 ANEMIA, UNSPECIFIED TYPE: ICD-10-CM

## 2017-07-05 DIAGNOSIS — I10 ESSENTIAL HYPERTENSION: Primary | ICD-10-CM

## 2017-07-05 DIAGNOSIS — E11.8 CONTROLLED DIABETES MELLITUS TYPE 2 WITH COMPLICATIONS, UNSPECIFIED LONG TERM INSULIN USE STATUS: ICD-10-CM

## 2017-07-05 RX ORDER — DULOXETIN HYDROCHLORIDE 30 MG/1
30 CAPSULE, DELAYED RELEASE ORAL DAILY
COMMUNITY
End: 2017-10-12 | Stop reason: SDUPTHER

## 2017-07-05 RX ORDER — INSULIN GLARGINE 100 [IU]/ML
INJECTION, SOLUTION SUBCUTANEOUS
Qty: 15 ML | Refills: 6 | Status: SHIPPED | OUTPATIENT
Start: 2017-07-05 | End: 2018-06-13 | Stop reason: SDUPTHER

## 2017-07-05 RX ORDER — CLOPIDOGREL BISULFATE 75 MG/1
75 TABLET ORAL DAILY
Qty: 90 TAB | Refills: 1 | Status: SHIPPED | OUTPATIENT
Start: 2017-07-05 | End: 2018-02-04 | Stop reason: SDUPTHER

## 2017-07-05 NOTE — PROGRESS NOTES
Linda Baig is a 64 y.o. female presents to office for DM and HTN. 1. Have you been to the ER, urgent care clinic or hospitalized since your last visit? no  2. Have you seen any other providers outside of [facility] since your last visit? yes  3. Have you had a Flu shot this year?  yes      Health Maintenance items with a due date reviewed with patient:  Health Maintenance Due   Topic Date Due    FOOT EXAM Q1  09/16/2015    PAP AKA CERVICAL CYTOLOGY  05/05/2016    EYE EXAM RETINAL OR DILATED Q1  09/05/2016    BREAST CANCER SCRN MAMMOGRAM  09/02/2017

## 2017-07-05 NOTE — PROGRESS NOTES
HISTORY OF PRESENT ILLNESS  Adriano Singh is a 64 y.o. female here for follow-up on diabetes hypertension hyperlipidemia and anemia. Patient states her blood sugars have been great. She states that she is feeling well and has no complaints today. .  Hypertension    The history is provided by the patient and medical records. This is a chronic problem. The problem has been gradually improving. Pertinent negatives include no chest pain, no orthopnea, no headaches, no peripheral edema, no dizziness and no shortness of breath. There are no associated agents to hypertension. Risk factors include dyslipidemia, diabetes mellitus, obesity and postmenopause. Diabetes   The history is provided by the patient and medical records. This is a chronic problem. The problem has been gradually improving. Pertinent negatives include no chest pain, no headaches and no shortness of breath. The symptoms are aggravated by eating. The symptoms are relieved by medications. Treatments tried: Please see med list. The treatment provided significant relief. Anemia   The history is provided by the patient and medical records. This is a chronic problem. The problem has not changed since onset. Pertinent negatives include no chest pain, no headaches and no shortness of breath. Nothing aggravates the symptoms. Nothing relieves the symptoms. She has tried nothing for the symptoms. Cholesterol Problem   The history is provided by the patient and medical records. This is a chronic problem. The problem has been gradually improving. Pertinent negatives include no chest pain, no headaches and no shortness of breath. The symptoms are aggravated by eating. Nothing relieves the symptoms.      Allergies   Allergen Reactions    Other Plant, Animal, Environmental Runny Nose and Sneezing     Current Outpatient Prescriptions on File Prior to Visit   Medication Sig Dispense Refill    metFORMIN (GLUCOPHAGE) 500 mg tablet Take 1 Tab by mouth two (2) times daily (with meals). 60 Tab 0    NIFEdipine ER (PROCARDIA XL) 90 mg ER tablet take 1 tablet once daily 30 Tab 0    spironolactone (ALDACTONE) 25 mg tablet take 1 tablet by mouth once daily 30 Tab 0    INVOKANA 300 mg tablet take 1 tablet once daily before BREAKFAST 30 Tab 3    DULoxetine (CYMBALTA) 60 mg capsule take 1 capsule by mouth once daily 90 Cap 1    losartan (COZAAR) 100 mg tablet take 1 tablet by mouth once daily 90 Tab 0    Blood-Glucose Meter (ACCU-CHEK HIRA) misc Use to test blood sugar 4 times daily 1 Each 0    furosemide (LASIX) 20 mg tablet take 1 tablet by mouth once daily 90 Tab 1    allopurinol (ZYLOPRIM) 300 mg tablet take 1 tablet once daily 90 Tab 0    CARVEDILOL (COREG PO) Take 50 mg by mouth two (2) times a day.  Liraglutide (VICTOZA) 0.6 mg/0.1 mL (18 mg/3 mL) sub-q pen 1.8 mg by SubCUTAneous route.  gabapentin (NEURONTIN) 300 mg capsule Take 300 mg by mouth three (3) times daily.  timolol (TIMOPTIC-XE) 0.5 % ophthalmic gel-forming Administer 1 Drop to both eyes daily.  simvastatin (ZOCOR) 40 mg tablet take 1 tablet by mouth NIGHTLY 90 Tab 1    BD INSULIN PEN NEEDLE UF SHORT 31 gauge x 5/16\" ndle USE TO INJECT ONE TIME DAILY WITH INSULIN PEN 90 Package 0    BD SINGLE USE SWABS REGULAR padm USE AS DIRECTED 300 Pad 2    isosorbide mononitrate ER (IMDUR) 60 mg CR tablet take 1 tablet by mouth every morning 30 Tab 0    Lancets (ACCU-CHEK MULTICLIX LANCET) misc USE TO TEST BLOOD GLUCOSE FOUR TIMES DAILY 400 Each 1    glucose blood VI test strips (ACCU-CHEK SMARTVIEW TEST STRIP) strip USE TO TEST BLOOD SUGAR FOUR TIMES DAILY 300 Strip 0    aspirin (BABY ASPIRIN) 81 mg chewable tablet Take 1 tablet by mouth daily. 90 tablet 3    Lancets (PRODIGY LANCETS) misc Tests qid 250.02 3 Package 3    multivitamin (ONE A DAY) tablet Take 1 tablet by mouth daily. No current facility-administered medications on file prior to visit.       Past Medical History: Diagnosis Date    Anemia NEC     Arthritis     Chronic kidney disease     Congestive heart failure, unspecified     Lakeisha    CRI (chronic renal insufficiency)     Zelesky    Diabetes ([facility]     eye- Margurite Grew    Fibromyalgia     Gout     H/O colonoscopy     H/O mammogram     2013    History of blood transfusion     Hypercholesterolemia     Hypertension     PCOS (polycystic ovarian syndrome)     Peripheral vascular disease ([facility] [facility]     Routine gynecological examination     Pleasant Mimi- May 2013    Stenosis of lumbosacral spine     Stroke [facility]) 1991    had dysarthria    Vitamin D deficiency      Past Surgical History:   Procedure Laterality Date    HX PTCA  2012    HX TONSILLECTOMY      [facility] US ASP BREAST CYST RT      STENT INSERTION Right 2012    right iliac     Family History   Problem Relation Age of Onset    Diabetes Mother     Hypertension Mother     Dementia Mother     Other Mother      aneurysm     Cancer Father      LUNG    Colon Cancer Sister          Diabetes Sister     Hypertension Sister     Diabetes Brother     Hypertension Brother      Social History     Social History    Marital status: SINGLE     Spouse name: N/A    Number of children: N/A    Years of education: N/A     Occupational History    Not on file.      Social History Main Topics    Smoking status: Former Smoker     Packs/day: 1.00     Years: 7.00     Types: Cigarettes     Quit date: 10/1/1981    Smokeless tobacco: Never Used    Alcohol use No    Drug use: No    Sexual activity: Not Currently     Other Topics Concern     Service No    Blood Transfusions Yes     2010    Caffeine Concern No    Occupational Exposure No    Hobby Hazards No    Sleep Concern No    Stress Concern No    Weight Concern Yes     WOULD LIKE TO LOSE WEIGHT AND MAINTAIN WEIGHT LOSS    Special Diet No    Back Care No    Exercise Yes     SOMETIMES    Bike Helmet No    Seat Belt Yes    Self-Exams Yes     Social History Narrative         Review of Systems   Constitutional: Negative. Eyes: Negative. Respiratory: Negative. Negative for shortness of breath. Cardiovascular: Negative. Negative for chest pain and orthopnea. Musculoskeletal: Negative. Neurological: Negative. Negative for dizziness and headaches. Endo/Heme/Allergies: Negative. Psychiatric/Behavioral: Negative. Visit Vitals    /60 (BP 1 Location: Right arm, BP Patient Position: Sitting)    Pulse 96    Temp 99 °F (37.2 °C) (Oral)    Resp 18    Ht 5' 4.5\" (1.638 m)    Wt 317 lb (143.8 kg)    LMP 06/25/2017 (Exact Date)    SpO2 98%    BMI 53.57 kg/m2       Physical Exam   Constitutional: She is oriented to person, place, and time. She appears well-developed and well-nourished. HENT:   Head: Normocephalic and atraumatic. Cardiovascular: Normal rate, regular rhythm, normal heart sounds and intact distal pulses. Exam reveals no gallop and no friction rub. No murmur heard. Pulmonary/Chest: Effort normal and breath sounds normal. No respiratory distress. She has no wheezes. She has no rales. Musculoskeletal: Normal range of motion. She exhibits no edema, tenderness or deformity. Neurological: She is alert and oriented to person, place, and time. No cranial nerve deficit. Coordination normal.   Skin: Skin is dry. No rash noted. No erythema. No pallor. Psychiatric: She has a normal mood and affect. Her behavior is normal. Judgment and thought content normal.   Nursing note and vitals reviewed. ASSESSMENT and PLAN    ICD-10-CM ICD-9-CM    1. Essential hypertension A37 571.9 METABOLIC PANEL, COMPREHENSIVE      URINALYSIS W/ RFLX MICROSCOPIC   2. Anemia, unspecified type D64.9 285.9 HEMOGLOBIN FRACTIONATION      CBC WITH AUTOMATED DIFF   3.  Controlled diabetes mellitus type 2 with complications, unspecified long term insulin use status E11.8 250.90 HEMOGLOBIN A1C WITH EAG      METABOLIC PANEL, COMPREHENSIVE      MICROALBUMIN, UR, RAND W/ MICROALBUMIN/CREA RATIO      URINALYSIS W/ RFLX MICROSCOPIC      insulin glargine (LANTUS SOLOSTAR) 100 unit/mL (3 mL) inpn   4. Hypercholesterolemia Z05.45 251.9 METABOLIC PANEL, COMPREHENSIVE      LIPID PANEL     Follow-up Disposition:  Return in about 4 weeks (around 8/2/2017).   the following changes in treatment are made:  insulin is increased to 32 units daily

## 2017-07-06 RX ORDER — NIFEDIPINE 90 MG/1
TABLET, EXTENDED RELEASE ORAL
Qty: 30 TAB | Refills: 0 | Status: SHIPPED | OUTPATIENT
Start: 2017-07-06 | End: 2017-07-07 | Stop reason: SDUPTHER

## 2017-07-06 RX ORDER — LOSARTAN POTASSIUM 100 MG/1
TABLET ORAL
Qty: 90 TAB | Refills: 0 | Status: SHIPPED | OUTPATIENT
Start: 2017-07-06 | End: 2017-07-07 | Stop reason: SDUPTHER

## 2017-07-10 RX ORDER — NIFEDIPINE 90 MG/1
TABLET, EXTENDED RELEASE ORAL
Qty: 90 TAB | Refills: 1 | Status: SHIPPED | OUTPATIENT
Start: 2017-07-10 | End: 2018-03-05 | Stop reason: SDUPTHER

## 2017-07-10 RX ORDER — LOSARTAN POTASSIUM 100 MG/1
TABLET ORAL
Qty: 90 TAB | Refills: 1 | Status: SHIPPED | OUTPATIENT
Start: 2017-07-10 | End: 2018-01-24 | Stop reason: SDUPTHER

## 2017-07-10 RX ORDER — PEN NEEDLE, DIABETIC 31 GX5/16"
NEEDLE, DISPOSABLE MISCELLANEOUS
Qty: 90 PEN NEEDLE | Refills: 1 | Status: SHIPPED | OUTPATIENT
Start: 2017-07-10 | End: 2017-10-16 | Stop reason: SDUPTHER

## 2017-07-26 RX ORDER — METFORMIN HYDROCHLORIDE 500 MG/1
TABLET ORAL
Qty: 60 TAB | Refills: 0 | Status: SHIPPED | OUTPATIENT
Start: 2017-07-26 | End: 2017-09-01 | Stop reason: SDUPTHER

## 2017-08-01 RX ORDER — SIMVASTATIN 40 MG/1
TABLET, FILM COATED ORAL
Qty: 90 TAB | Refills: 1 | Status: SHIPPED | OUTPATIENT
Start: 2017-08-01 | End: 2018-03-05 | Stop reason: SDUPTHER

## 2017-08-14 ENCOUNTER — TELEPHONE (OUTPATIENT)
Dept: FAMILY MEDICINE CLINIC | Age: 57
End: 2017-08-14

## 2017-08-14 DIAGNOSIS — H40.1110 PRIMARY OPEN ANGLE GLAUCOMA OF RIGHT EYE, UNSPECIFIED GLAUCOMA STAGE: Primary | ICD-10-CM

## 2017-08-14 DIAGNOSIS — H25.13 NUCLEAR SCLEROTIC CATARACT OF BOTH EYES: ICD-10-CM

## 2017-08-14 NOTE — TELEPHONE ENCOUNTER
Recd call from [facility] at [address] [phone].  Per [facility] states that the referral has ,   Pt has appt on  17 w/ Dr Catrina Silva     Diagnosis code : H40.11x1   H25.13    Fax : [phone]

## 2017-08-17 ENCOUNTER — HOSPITAL ENCOUNTER (OUTPATIENT)
Dept: LAB | Age: 57
Discharge: HOME | End: 2017-08-17
Payer: MEDICARE

## 2017-08-17 DIAGNOSIS — E78.00 HYPERCHOLESTEROLEMIA: ICD-10-CM

## 2017-08-17 DIAGNOSIS — D64.9 ANEMIA, UNSPECIFIED TYPE: ICD-10-CM

## 2017-08-17 DIAGNOSIS — E11.8 CONTROLLED DIABETES MELLITUS TYPE 2 WITH COMPLICATIONS, UNSPECIFIED LONG TERM INSULIN USE STATUS: ICD-10-CM

## 2017-08-17 DIAGNOSIS — I10 ESSENTIAL HYPERTENSION: ICD-10-CM

## 2017-08-17 LAB
ALBUMIN SERPL-MCNC: 3.5 G/DL (ref 3.4–5)
ALBUMIN/GLOB SERPL: 0.9 {RATIO} (ref 0.8–1.7)
ALP SERPL-CCNC: 76 U/L (ref 45–117)
ALT SERPL-CCNC: 17 U/L (ref 13–56)
ANION GAP SERPL CALC-SCNC: 9 MMOL/L (ref 3–18)
APPEARANCE UR: CLEAR
AST SERPL-CCNC: 16 U/L (ref 15–37)
BACTERIA URNS QL MICRO: ABNORMAL /HPF
BASOPHILS # BLD: 0 K/UL (ref 0–0.06)
BASOPHILS NFR BLD: 0 % (ref 0–2)
BILIRUB SERPL-MCNC: 0.3 MG/DL (ref 0.2–1)
BILIRUB UR QL: NEGATIVE
BUN SERPL-MCNC: 27 MG/DL (ref 7–18)
BUN/CREAT SERPL: 21 (ref 12–20)
CALCIUM SERPL-MCNC: 9.3 MG/DL (ref 8.5–10.1)
CHLORIDE SERPL-SCNC: 105 MMOL/L (ref 100–108)
CHOLEST SERPL-MCNC: 128 MG/DL
CO2 SERPL-SCNC: 23 MMOL/L (ref 21–32)
COLOR UR: YELLOW
CREAT SERPL-MCNC: 1.27 MG/DL (ref 0.6–1.3)
DIFFERENTIAL METHOD BLD: ABNORMAL
EOSINOPHIL # BLD: 0.2 K/UL (ref 0–0.4)
EOSINOPHIL NFR BLD: 2 % (ref 0–5)
EPITH CASTS URNS QL MICRO: ABNORMAL /LPF (ref 0–5)
ERYTHROCYTE [DISTWIDTH] IN BLOOD BY AUTOMATED COUNT: 22 % (ref 11.6–14.5)
EST. AVERAGE GLUCOSE BLD GHB EST-MCNC: 146 MG/DL
GLOBULIN SER CALC-MCNC: 3.9 G/DL (ref 2–4)
GLUCOSE SERPL-MCNC: 107 MG/DL (ref 74–99)
GLUCOSE UR STRIP.AUTO-MCNC: >1000 MG/DL
HBA1C MFR BLD: 6.7 % (ref 4.2–5.6)
HCT VFR BLD AUTO: 32 % (ref 35–45)
HDLC SERPL-MCNC: 47 MG/DL (ref 40–60)
HDLC SERPL: 2.7 {RATIO} (ref 0–5)
HGB BLD-MCNC: 9.2 G/DL (ref 12–16)
HGB UR QL STRIP: NEGATIVE
KETONES UR QL STRIP.AUTO: NEGATIVE MG/DL
LDLC SERPL CALC-MCNC: 53.8 MG/DL (ref 0–100)
LEUKOCYTE ESTERASE UR QL STRIP.AUTO: ABNORMAL
LIPID PROFILE,FLP: NORMAL
LYMPHOCYTES # BLD: 2.2 K/UL (ref 0.9–3.6)
LYMPHOCYTES NFR BLD: 27 % (ref 21–52)
MCH RBC QN AUTO: 18.7 PG (ref 24–34)
MCHC RBC AUTO-ENTMCNC: 28.8 G/DL (ref 31–37)
MCV RBC AUTO: 64.9 FL (ref 74–97)
MONOCYTES # BLD: 0.4 K/UL (ref 0.05–1.2)
MONOCYTES NFR BLD: 5 % (ref 3–10)
NEUTS SEG # BLD: 5.4 K/UL (ref 1.8–8)
NEUTS SEG NFR BLD: 66 % (ref 40–73)
NITRITE UR QL STRIP.AUTO: NEGATIVE
PH UR STRIP: 5.5 [PH] (ref 5–8)
PLATELET # BLD AUTO: 331 K/UL (ref 135–420)
POTASSIUM SERPL-SCNC: 4.9 MMOL/L (ref 3.5–5.5)
PROT SERPL-MCNC: 7.4 G/DL (ref 6.4–8.2)
PROT UR STRIP-MCNC: ABNORMAL MG/DL
RBC # BLD AUTO: 4.93 M/UL (ref 4.2–5.3)
RBC #/AREA URNS HPF: 0 /HPF (ref 0–5)
SODIUM SERPL-SCNC: 137 MMOL/L (ref 136–145)
SP GR UR REFRACTOMETRY: 1.02 (ref 1–1.03)
TRIGL SERPL-MCNC: 136 MG/DL (ref ?–150)
UROBILINOGEN UR QL STRIP.AUTO: 0.2 EU/DL (ref 0.2–1)
VLDLC SERPL CALC-MCNC: 27.2 MG/DL
WBC # BLD AUTO: 8.3 K/UL (ref 4.6–13.2)
WBC URNS QL MICRO: ABNORMAL /HPF (ref 0–4)

## 2017-08-17 PROCEDURE — 82043 UR ALBUMIN QUANTITATIVE: CPT | Performed by: INTERNAL MEDICINE

## 2017-08-17 PROCEDURE — 83021 HEMOGLOBIN CHROMOTOGRAPHY: CPT | Performed by: INTERNAL MEDICINE

## 2017-08-17 PROCEDURE — 81001 URINALYSIS AUTO W/SCOPE: CPT | Performed by: INTERNAL MEDICINE

## 2017-08-17 PROCEDURE — 80053 COMPREHEN METABOLIC PANEL: CPT | Performed by: INTERNAL MEDICINE

## 2017-08-17 PROCEDURE — 85025 COMPLETE CBC W/AUTO DIFF WBC: CPT | Performed by: INTERNAL MEDICINE

## 2017-08-17 PROCEDURE — 36415 COLL VENOUS BLD VENIPUNCTURE: CPT | Performed by: INTERNAL MEDICINE

## 2017-08-17 PROCEDURE — 83036 HEMOGLOBIN GLYCOSYLATED A1C: CPT | Performed by: INTERNAL MEDICINE

## 2017-08-17 PROCEDURE — 80061 LIPID PANEL: CPT | Performed by: INTERNAL MEDICINE

## 2017-08-18 LAB
CREAT UR-MCNC: 138.19 MG/DL (ref 30–125)
MICROALBUMIN UR-MCNC: 3.7 MG/DL (ref 0–3)
MICROALBUMIN/CREAT UR-RTO: 27 MG/G (ref 0–30)

## 2017-08-21 LAB
HGB A MFR BLD: 98.1 % (ref 94–98)
HGB A2 MFR BLD COLUMN CHROM: 1.9 % (ref 0.7–3.1)
HGB C MFR BLD: 0 %
HGB F MFR BLD: 0 % (ref 0–2)
HGB FRACT BLD-IMP: ABNORMAL
HGB S BLD QL SOLY: NEGATIVE
HGB S MFR BLD: 0 %

## 2017-09-07 RX ORDER — SPIRONOLACTONE 25 MG/1
TABLET ORAL
Qty: 30 TAB | Refills: 0 | Status: SHIPPED | OUTPATIENT
Start: 2017-09-07 | End: 2017-10-05 | Stop reason: SDUPTHER

## 2017-09-07 RX ORDER — METFORMIN HYDROCHLORIDE 500 MG/1
TABLET ORAL
Qty: 60 TAB | Refills: 0 | Status: SHIPPED | OUTPATIENT
Start: 2017-09-07 | End: 2017-10-05 | Stop reason: SDUPTHER

## 2017-09-28 DIAGNOSIS — M10.9 GOUT, UNSPECIFIED CAUSE, UNSPECIFIED CHRONICITY, UNSPECIFIED SITE: ICD-10-CM

## 2017-10-04 RX ORDER — LIRAGLUTIDE 6 MG/ML
INJECTION SUBCUTANEOUS
Qty: 9 ADJUSTABLE DOSE PRE-FILLED PEN SYRINGE | Refills: 0 | Status: SHIPPED | OUTPATIENT
Start: 2017-10-04 | End: 2017-10-12 | Stop reason: SDUPTHER

## 2017-10-04 RX ORDER — ALLOPURINOL 300 MG/1
TABLET ORAL
Qty: 90 TAB | Refills: 0 | Status: SHIPPED | OUTPATIENT
Start: 2017-10-04 | End: 2017-10-12 | Stop reason: SDUPTHER

## 2017-10-05 RX ORDER — METFORMIN HYDROCHLORIDE 500 MG/1
TABLET ORAL
Qty: 60 TAB | Refills: 0 | Status: SHIPPED | OUTPATIENT
Start: 2017-10-05 | End: 2018-04-26 | Stop reason: DRUGHIGH

## 2017-10-05 RX ORDER — SPIRONOLACTONE 25 MG/1
TABLET ORAL
Qty: 30 TAB | Refills: 0 | Status: SHIPPED | OUTPATIENT
Start: 2017-10-05 | End: 2017-11-02 | Stop reason: SDUPTHER

## 2017-10-09 ENCOUNTER — TELEPHONE (OUTPATIENT)
Dept: FAMILY MEDICINE CLINIC | Age: 57
End: 2017-10-09

## 2017-10-12 ENCOUNTER — OFFICE VISIT (OUTPATIENT)
Dept: FAMILY MEDICINE CLINIC | Age: 57
End: 2017-10-12

## 2017-10-12 VITALS
SYSTOLIC BLOOD PRESSURE: 112 MMHG | HEART RATE: 95 BPM | RESPIRATION RATE: 16 BRPM | OXYGEN SATURATION: 99 % | DIASTOLIC BLOOD PRESSURE: 60 MMHG | BODY MASS INDEX: 48.82 KG/M2 | HEIGHT: 65 IN | WEIGHT: 293 LBS

## 2017-10-12 DIAGNOSIS — Z79.4 CONTROLLED TYPE 2 DIABETES MELLITUS WITH OTHER CIRCULATORY COMPLICATION, WITH LONG-TERM CURRENT USE OF INSULIN (HCC): ICD-10-CM

## 2017-10-12 DIAGNOSIS — E78.00 HYPERCHOLESTEROLEMIA: ICD-10-CM

## 2017-10-12 DIAGNOSIS — I73.9 PERIPHERAL VASCULAR DISEASE (HCC): ICD-10-CM

## 2017-10-12 DIAGNOSIS — E11.59 CONTROLLED TYPE 2 DIABETES MELLITUS WITH OTHER CIRCULATORY COMPLICATION, WITH LONG-TERM CURRENT USE OF INSULIN (HCC): ICD-10-CM

## 2017-10-12 DIAGNOSIS — D64.9 ANEMIA, UNSPECIFIED TYPE: ICD-10-CM

## 2017-10-12 DIAGNOSIS — G62.9 NEUROPATHY: ICD-10-CM

## 2017-10-12 DIAGNOSIS — I10 ESSENTIAL HYPERTENSION: Primary | ICD-10-CM

## 2017-10-12 RX ORDER — DULOXETIN HYDROCHLORIDE 60 MG/1
60 CAPSULE, DELAYED RELEASE ORAL DAILY
COMMUNITY
End: 2017-12-29 | Stop reason: SDUPTHER

## 2017-10-12 RX ORDER — LANCETS
EACH MISCELLANEOUS
Qty: 400 EACH | Refills: 1 | Status: SHIPPED | OUTPATIENT
Start: 2017-10-12

## 2017-10-12 RX ORDER — METFORMIN HYDROCHLORIDE 1000 MG/1
1000 TABLET ORAL 2 TIMES DAILY WITH MEALS
Qty: 60 TAB | Refills: 6 | Status: SHIPPED | OUTPATIENT
Start: 2017-10-12 | End: 2018-03-07 | Stop reason: SDUPTHER

## 2017-10-12 RX ORDER — ALCOHOL 2.38 KG/3.79L
1 GEL TOPICAL DAILY
Qty: 90 CAP | Refills: 1 | Status: SHIPPED | OUTPATIENT
Start: 2017-10-12

## 2017-10-12 NOTE — PROGRESS NOTES
HISTORY OF PRESENT ILLNESS  Lana Wang is a 62 y.o. female here for follow-up on diabetes hypertension hyperlipidemia. Fasting blood sugars have been around 108-109 in the mornings. Postprandial blood sugars are 130-170 depending on what she eats. She has a history of peripheral vascular disease and is now complaining of numbness and pain in her right foot  Diabetes   The history is provided by the patient and medical records. This is a chronic problem. The problem has been gradually improving. Pertinent negatives include no chest pain, no abdominal pain, no headaches and no shortness of breath. The symptoms are aggravated by eating. The symptoms are relieved by medications. Treatments tried: Metformin insulin and Invokana and Victoza. The treatment provided significant relief. Cholesterol Problem   The history is provided by the patient and medical records. This is a chronic problem. The problem has been gradually improving. Pertinent negatives include no chest pain, no abdominal pain, no headaches and no shortness of breath. The symptoms are aggravated by eating. The symptoms are relieved by medications. Treatments tried: Zocor. The treatment provided significant relief. Hypertension    The history is provided by the patient and medical records. This is a chronic problem. The problem has been gradually improving. Pertinent negatives include no chest pain, no orthopnea, no confusion, no headaches, no peripheral edema, no dizziness and no shortness of breath. There are no associated agents to hypertension. Risk factors include diabetes mellitus, dyslipidemia, obesity and postmenopause. Anemia   The history is provided by the patient and medical records. The problem has not changed since onset. Pertinent negatives include no chest pain, no abdominal pain, no headaches and no shortness of breath. Nothing aggravates the symptoms. Nothing relieves the symptoms. She has tried nothing for the symptoms. Foot Pain   The history is provided by the patient and medical records. This is a chronic problem. The problem has not changed since onset. Pertinent negatives include no chest pain, no abdominal pain, no headaches and no shortness of breath. The symptoms are aggravated by standing. Nothing relieves the symptoms. She has tried nothing for the symptoms. Numbness   The history is provided by the patient. This is a new problem. The problem has been gradually worsening. There was right lower extremity focality noted. Pertinent negatives include no focal weakness, no slurred speech, no speech difficulty, no memory loss, no movement disorder, no agitation, no visual change, no mental status change, no unresponsiveness and no disorientation. There has been no fever. Pertinent negatives include no shortness of breath, no chest pain, no altered mental status, no confusion, no headaches, no bowel incontinence and no bladder incontinence. Allergies   Allergen Reactions    Other Plant, Animal, Environmental Runny Nose and Sneezing     Current Outpatient Prescriptions on File Prior to Visit   Medication Sig Dispense Refill    spironolactone (ALDACTONE) 25 mg tablet take 1 tablet by mouth once daily 30 Tab 0    metFORMIN (GLUCOPHAGE) 500 mg tablet take 1 tablet by mouth twice a day with meals 60 Tab 0    simvastatin (ZOCOR) 40 mg tablet take 1 tablet by mouth at bedtime 90 Tab 1    BD INSULIN PEN NEEDLE UF SHORT 31 gauge x 5/16\" ndle USE TO INJECT ONE TIME DAILY WITH INSULIN PEN 90 Pen Needle 1    losartan (COZAAR) 100 mg tablet take 1 tablet by mouth once daily 90 Tab 1    NIFEdipine ER (PROCARDIA XL) 90 mg ER tablet take 1 tablet by mouth once daily 90 Tab 1    clopidogrel (PLAVIX) 75 mg tab Take 1 Tab by mouth daily.  90 Tab 1    insulin glargine (LANTUS SOLOSTAR) 100 unit/mL (3 mL) inpn Inject 32 units once daily 15 mL 6    INVOKANA 300 mg tablet take 1 tablet once daily before BREAKFAST 30 Tab 3    Blood-Glucose Meter (ACCU-CHEK HIRA) misc Use to test blood sugar 4 times daily 1 Each 0    furosemide (LASIX) 20 mg tablet take 1 tablet by mouth once daily 90 Tab 1    allopurinol (ZYLOPRIM) 300 mg tablet take 1 tablet once daily 90 Tab 0    CARVEDILOL (COREG PO) Take 50 mg by mouth two (2) times a day.  Liraglutide (VICTOZA) 0.6 mg/0.1 mL (18 mg/3 mL) sub-q pen 1.8 mg by SubCUTAneous route.  gabapentin (NEURONTIN) 300 mg capsule Take 300 mg by mouth three (3) times daily.  timolol (TIMOPTIC-XE) 0.5 % ophthalmic gel-forming Administer 1 Drop to both eyes daily.  BD SINGLE USE SWABS REGULAR padm USE AS DIRECTED 300 Pad 2    isosorbide mononitrate ER (IMDUR) 60 mg CR tablet take 1 tablet by mouth every morning 30 Tab 0    glucose blood VI test strips (ACCU-CHEK SMARTVIEW TEST STRIP) strip USE TO TEST BLOOD SUGAR FOUR TIMES DAILY 300 Strip 0    aspirin (BABY ASPIRIN) 81 mg chewable tablet Take 1 tablet by mouth daily. 90 tablet 3    Lancets (PRODIGY LANCETS) misc Tests qid 250.02 3 Package 3    multivitamin (ONE A DAY) tablet Take 1 tablet by mouth daily. No current facility-administered medications on file prior to visit.       Past Medical History:   Diagnosis Date    Anemia NEC     Arthritis     Chronic kidney disease     Congestive heart failure, unspecified     Lakeisha    CRI (chronic renal insufficiency)     Zelesky    Diabetes ([facility] [address]     eye- Alisha Gallon    Fibromyalgia     Gout     H/O colonoscopy     H/O mammogram     5/2013    History of blood transfusion     Hypercholesterolemia     Hypertension     PCOS (polycystic ovarian syndrome)     Peripheral vascular disease ([facility] [address]     Routine gynecological examination     Elsa Burnette- May 2013    Stenosis of lumbosacral spine     Stroke [facility]) 1991    had dysarthria    Vitamin D deficiency      Past Surgical History:   Procedure Laterality Date    HX PTCA  April 27, 2012    HX TONSILLECTOMY  1981    SKYLA US ASP BREAST CYST RT      STENT INSERTION Right 2012    right iliac     Family History   Problem Relation Age of Onset    Diabetes Mother     Hypertension Mother     Dementia Mother     Other Mother      aneurysm     Cancer Father      LUNG    Colon Cancer Sister          Diabetes Sister     Hypertension Sister     Diabetes Brother     Hypertension Brother      Social History     Social History    Marital status: SINGLE     Spouse name: N/A    Number of children: N/A    Years of education: N/A     Occupational History    Not on file. Social History Main Topics    Smoking status: Former Smoker     Packs/day: 1.00     Years: 7.00     Types: Cigarettes     Quit date: 10/1/1981    Smokeless tobacco: Never Used    Alcohol use No    Drug use: No    Sexual activity: Not Currently     Other Topics Concern     Service No    Blood Transfusions Yes         Caffeine Concern No    Occupational Exposure No    Hobby Hazards No    Sleep Concern No    Stress Concern No    Weight Concern Yes     WOULD LIKE TO LOSE WEIGHT AND MAINTAIN WEIGHT LOSS    Special Diet No    Back Care No    Exercise Yes     SOMETIMES    Bike Helmet No    Seat Belt Yes    Self-Exams Yes     Social History Narrative       Review of Systems   Constitutional: Negative. Eyes: Negative. Respiratory: Negative. Negative for shortness of breath. Cardiovascular: Negative. Negative for chest pain and orthopnea. Gastrointestinal: Negative for abdominal pain and bowel incontinence. Genitourinary: Negative for bladder incontinence. Musculoskeletal: Negative. Neurological: Positive for numbness. Negative for dizziness, focal weakness, speech difficulty and headaches. Endo/Heme/Allergies: Negative. Psychiatric/Behavioral: Negative. Negative for agitation, confusion and memory loss.      Visit Vitals    /60 (BP 1 Location: Left arm, BP Patient Position: Sitting)    Pulse 95    Resp 16    Ht 5' 4.5\" (1.638 m)    Wt 316 lb (143.3 kg)    SpO2 99%    BMI 53.4 kg/m2       Physical Exam   Constitutional: She is oriented to person, place, and time. She appears well-developed and well-nourished. HENT:   Head: Normocephalic and atraumatic. Cardiovascular: Normal rate, regular rhythm, normal heart sounds and intact distal pulses. Exam reveals no gallop and no friction rub. No murmur heard. Pulmonary/Chest: Effort normal and breath sounds normal. No respiratory distress. She has no wheezes. She has no rales. Musculoskeletal: Normal range of motion. She exhibits no edema, tenderness or deformity. Neurological: She is alert and oriented to person, place, and time. No cranial nerve deficit. Coordination normal.   Skin: Skin is warm and dry. No rash noted. No erythema. No pallor. Psychiatric: She has a normal mood and affect. Her behavior is normal. Judgment and thought content normal.   Nursing note and vitals reviewed. ASSESSMENT and PLAN    ICD-10-CM ICD-9-CM    1. Essential hypertension I10 401.9    2. Peripheral vascular disease (HCC) I73.9 443.9    3. Hypercholesterolemia E78.00 272.0    4. Controlled type 2 diabetes mellitus with other circulatory complication, with long-term current use of insulin [facility] E11.59 250.70     Z79.4 V58.67      Follow-up Disposition:  Return in about 3 months (around 1/12/2018). Patient has been instructed to discontinue Invokana because of history of PVD.

## 2017-10-12 NOTE — PROGRESS NOTES
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 female presents to office for follow up on DM, HTN, Anemia and hyperlipidemia. 1. Have you been to the ER, urgent care clinic or hospitalized since your last visit?  No           Health Maintenance items with a due date reviewed with patient:  Health Maintenance Due   Topic Date Due    FOOT EXAM Q1  09/16/2015    PAP AKA CERVICAL CYTOLOGY  05/05/2016    EYE EXAM RETINAL OR DILATED Q1  09/05/2016    INFLUENZA AGE 9 TO ADULT  08/01/2017    BREAST CANCER SCRN MAMMOGRAM  09/02/2017

## 2017-10-13 ENCOUNTER — TELEPHONE (OUTPATIENT)
Dept: FAMILY MEDICINE CLINIC | Age: 57
End: 2017-10-13

## 2017-10-13 DIAGNOSIS — I73.9 PERIPHERAL VASCULAR DISEASE (HCC): Primary | ICD-10-CM

## 2017-10-13 DIAGNOSIS — E11.59 CONTROLLED TYPE 2 DIABETES MELLITUS WITH OTHER CIRCULATORY COMPLICATION, WITH LONG-TERM CURRENT USE OF INSULIN (HCC): ICD-10-CM

## 2017-10-13 DIAGNOSIS — Z79.4 CONTROLLED TYPE 2 DIABETES MELLITUS WITH OTHER CIRCULATORY COMPLICATION, WITH LONG-TERM CURRENT USE OF INSULIN (HCC): ICD-10-CM

## 2017-10-13 LAB
ALBUMIN SERPL-MCNC: 3.9 G/DL (ref 3.5–5.5)
ALBUMIN/GLOB SERPL: 1.3 {RATIO} (ref 1.2–2.2)
ALP SERPL-CCNC: 56 IU/L (ref 39–117)
ALT SERPL-CCNC: 12 IU/L (ref 0–32)
AST SERPL-CCNC: 16 IU/L (ref 0–40)
BASOPHILS # BLD AUTO: 0 X10E3/UL (ref 0–0.2)
BASOPHILS NFR BLD AUTO: 0 %
BILIRUB SERPL-MCNC: 0.3 MG/DL (ref 0–1.2)
BUN SERPL-MCNC: 17 MG/DL (ref 6–24)
BUN/CREAT SERPL: 15 (ref 9–23)
CALCIUM SERPL-MCNC: 9.6 MG/DL (ref 8.7–10.2)
CHLORIDE SERPL-SCNC: 102 MMOL/L (ref 96–106)
CHOLEST SERPL-MCNC: 128 MG/DL (ref 100–199)
CO2 SERPL-SCNC: 23 MMOL/L (ref 18–29)
CREAT SERPL-MCNC: 1.15 MG/DL (ref 0.57–1)
CREAT UR-MCNC: NORMAL MG/DL
EOSINOPHIL # BLD AUTO: 0.2 X10E3/UL (ref 0–0.4)
EOSINOPHIL NFR BLD AUTO: 2 %
ERYTHROCYTE [DISTWIDTH] IN BLOOD BY AUTOMATED COUNT: 20.2 % (ref 12.3–15.4)
EST. AVERAGE GLUCOSE BLD GHB EST-MCNC: 137 MG/DL
GLOBULIN SER CALC-MCNC: 2.9 G/DL (ref 1.5–4.5)
GLUCOSE SERPL-MCNC: 117 MG/DL (ref 65–99)
GLUCOSE UR QL: NORMAL
HBA1C MFR BLD: 6.4 % (ref 4.8–5.6)
HCT VFR BLD AUTO: 30.6 % (ref 34–46.6)
HDLC SERPL-MCNC: 41 MG/DL
HGB BLD-MCNC: 9.1 G/DL (ref 11.1–15.9)
IMM GRANULOCYTES # BLD: 0 X10E3/UL (ref 0–0.1)
IMM GRANULOCYTES NFR BLD: 0 %
INTERPRETATION, 910389: NORMAL
INTERPRETATION: NORMAL
KETONES UR QL STRIP: NORMAL
LDLC SERPL CALC-MCNC: 56 MG/DL (ref 0–99)
LYMPHOCYTES # BLD AUTO: 2.9 X10E3/UL (ref 0.7–3.1)
LYMPHOCYTES NFR BLD AUTO: 41 %
MCH RBC QN AUTO: 18.3 PG (ref 26.6–33)
MCHC RBC AUTO-ENTMCNC: 29.7 G/DL (ref 31.5–35.7)
MCV RBC AUTO: 61 FL (ref 79–97)
MICROALBUMIN UR-MCNC: NORMAL
MONOCYTES # BLD AUTO: 0.3 X10E3/UL (ref 0.1–0.9)
MONOCYTES NFR BLD AUTO: 5 %
NEUTROPHILS # BLD AUTO: 3.7 X10E3/UL (ref 1.4–7)
NEUTROPHILS NFR BLD AUTO: 52 %
PDF IMAGE, 910387: NORMAL
PH UR STRIP: NORMAL [PH]
PLATELET # BLD AUTO: 400 X10E3/UL (ref 150–379)
POTASSIUM SERPL-SCNC: 4.6 MMOL/L (ref 3.5–5.2)
PROT SERPL-MCNC: 6.8 G/DL (ref 6–8.5)
PROT UR QL STRIP: NORMAL
RBC # BLD AUTO: 4.98 X10E6/UL (ref 3.77–5.28)
SODIUM SERPL-SCNC: 141 MMOL/L (ref 134–144)
SP GR UR: NORMAL
TRIGL SERPL-MCNC: 155 MG/DL (ref 0–149)
VLDLC SERPL CALC-MCNC: 31 MG/DL (ref 5–40)
WBC # BLD AUTO: 7.2 X10E3/UL (ref 3.4–10.8)

## 2017-10-13 NOTE — TELEPHONE ENCOUNTER
Pt states that Dr. Ascencion Haddad was supposed to put in a podiatry referral for diabetic neuropathy. Please advise.

## 2017-10-14 LAB
ALBUMIN/CREAT UR: 42.3 MG/G CREAT (ref 0–30)
APPEARANCE UR: CLEAR
BILIRUB UR QL STRIP: NEGATIVE
COLOR UR: YELLOW
CREAT UR-MCNC: 81.4 MG/DL
GLUCOSE UR QL: ABNORMAL
HGB UR QL STRIP: NEGATIVE
KETONES UR QL STRIP: NEGATIVE
LEUKOCYTE ESTERASE UR QL STRIP: NEGATIVE
MICRO URNS: ABNORMAL
MICROALBUMIN UR-MCNC: 34.4 UG/ML
NITRITE UR QL STRIP: NEGATIVE
PH UR STRIP: 5.5 [PH] (ref 5–7.5)
PROT UR QL STRIP: NEGATIVE
SP GR UR: 1.02 (ref 1–1.03)
UROBILINOGEN UR STRIP-MCNC: 0.2 MG/DL (ref 0.2–1)

## 2017-10-16 NOTE — TELEPHONE ENCOUNTER
Pt calling to request medication refill of:    Requested Prescriptions     Pending Prescriptions Disp Refills    Insulin Needles, Disposable, (BD INSULIN PEN NEEDLE UF SHORT) 31 gauge x 5/16\" ndle 300 Pen Needle 1     Sig: three (3) times daily. be sent to [facility]. Pt has about 30 needles remaining. Pts last appt was 10/12/17, next appt sched for 01/11/2018. Advised pt of 72 hour time frame for refill requests. Please advise.

## 2017-10-18 RX ORDER — PEN NEEDLE, DIABETIC 30 GX3/16"
NEEDLE, DISPOSABLE MISCELLANEOUS 3 TIMES DAILY
Qty: 300 PEN NEEDLE | Refills: 1 | Status: SHIPPED | OUTPATIENT
Start: 2017-10-18

## 2017-10-26 NOTE — TELEPHONE ENCOUNTER
[facility] pharmacy called pt and informed her that the Rx for b12 tablets would be $89 and told her to contact her Doctor to see if she could do the shots in office or injections at home. Or another medication all together.

## 2017-11-02 RX ORDER — LIRAGLUTIDE 6 MG/ML
INJECTION SUBCUTANEOUS
Qty: 9 ADJUSTABLE DOSE PRE-FILLED PEN SYRINGE | Refills: 0 | Status: SHIPPED | OUTPATIENT
Start: 2017-11-02 | End: 2017-12-06 | Stop reason: SDUPTHER

## 2017-11-02 RX ORDER — SPIRONOLACTONE 25 MG/1
TABLET ORAL
Qty: 30 TAB | Refills: 0 | Status: SHIPPED | OUTPATIENT
Start: 2017-11-02 | End: 2017-12-06 | Stop reason: SDUPTHER

## 2017-12-06 RX ORDER — LIRAGLUTIDE 6 MG/ML
INJECTION SUBCUTANEOUS
Qty: 9 ADJUSTABLE DOSE PRE-FILLED PEN SYRINGE | Refills: 0 | Status: SHIPPED | OUTPATIENT
Start: 2017-12-06 | End: 2018-01-01 | Stop reason: SDUPTHER

## 2017-12-06 RX ORDER — SPIRONOLACTONE 25 MG/1
TABLET ORAL
Qty: 30 TAB | Refills: 0 | Status: SHIPPED | OUTPATIENT
Start: 2017-12-06 | End: 2018-01-01 | Stop reason: SDUPTHER

## 2017-12-26 RX ORDER — FUROSEMIDE 20 MG/1
TABLET ORAL
Qty: 90 TAB | Refills: 1 | Status: SHIPPED | OUTPATIENT
Start: 2017-12-26 | End: 2018-03-07 | Stop reason: SDUPTHER

## 2017-12-29 DIAGNOSIS — M10.9 GOUT, UNSPECIFIED CAUSE, UNSPECIFIED CHRONICITY, UNSPECIFIED SITE: ICD-10-CM

## 2018-01-03 RX ORDER — ISOSORBIDE MONONITRATE 60 MG/1
TABLET, EXTENDED RELEASE ORAL
Qty: 30 TAB | Refills: 3 | Status: SHIPPED | OUTPATIENT
Start: 2018-01-03

## 2018-01-03 RX ORDER — ALLOPURINOL 300 MG/1
TABLET ORAL
Qty: 90 TAB | Refills: 0 | Status: SHIPPED | OUTPATIENT
Start: 2018-01-03 | End: 2018-04-26 | Stop reason: SDUPTHER

## 2018-01-03 RX ORDER — DULOXETIN HYDROCHLORIDE 60 MG/1
60 CAPSULE, DELAYED RELEASE ORAL DAILY
Qty: 30 CAP | Refills: 6 | Status: SHIPPED | OUTPATIENT
Start: 2018-01-03

## 2018-01-03 RX ORDER — LIRAGLUTIDE 6 MG/ML
INJECTION SUBCUTANEOUS
Qty: 9 ML | Refills: 0 | Status: SHIPPED | OUTPATIENT
Start: 2018-01-03 | End: 2018-02-05 | Stop reason: SDUPTHER

## 2018-01-03 RX ORDER — SPIRONOLACTONE 25 MG/1
TABLET ORAL
Qty: 30 TAB | Refills: 0 | Status: SHIPPED | OUTPATIENT
Start: 2018-01-03 | End: 2018-02-05 | Stop reason: SDUPTHER

## 2018-01-08 ENCOUNTER — TELEPHONE (OUTPATIENT)
Dept: FAMILY MEDICINE CLINIC | Age: 58
End: 2018-01-08

## 2018-01-08 NOTE — TELEPHONE ENCOUNTER
Pharmacy called and stated that the patients RX for Cymbalta needed an additional rx of Cymbalta 30mg sent as the instructions were for her to take 60mg cap in the AM and 30 mg in QHS.  Verbal order given to pharmacist.

## 2018-01-24 NOTE — TELEPHONE ENCOUNTER
Patient has an appointment scheduled for 01/31/2018 with Dr. Smith Parents. Please see refill in the interim.      Requested Prescriptions     Pending Prescriptions Disp Refills    losartan (COZAAR) 100 mg tablet 90 Tab 0     Sig: take 1 tablet by mouth once daily

## 2018-01-25 RX ORDER — LOSARTAN POTASSIUM 100 MG/1
TABLET ORAL
Qty: 90 TAB | Refills: 0 | Status: SHIPPED | OUTPATIENT
Start: 2018-01-25 | End: 2018-03-07 | Stop reason: SDUPTHER

## 2018-01-29 RX ORDER — LOSARTAN POTASSIUM 100 MG/1
TABLET ORAL
Qty: 90 TAB | Refills: 0 | Status: CANCELLED | OUTPATIENT
Start: 2018-01-29

## 2018-02-05 RX ORDER — CLOPIDOGREL BISULFATE 75 MG/1
TABLET ORAL
Qty: 30 TAB | Refills: 0 | Status: SHIPPED | OUTPATIENT
Start: 2018-02-05 | End: 2018-03-07 | Stop reason: SDUPTHER

## 2018-02-05 RX ORDER — SPIRONOLACTONE 25 MG/1
TABLET ORAL
Qty: 30 TAB | Refills: 0 | Status: SHIPPED | OUTPATIENT
Start: 2018-02-05 | End: 2018-03-07 | Stop reason: SDUPTHER

## 2018-02-05 RX ORDER — LIRAGLUTIDE 6 MG/ML
INJECTION SUBCUTANEOUS
Qty: 3 ML | Refills: 0 | Status: SHIPPED | OUTPATIENT
Start: 2018-02-05 | End: 2018-04-15 | Stop reason: SDUPTHER

## 2018-02-27 NOTE — TELEPHONE ENCOUNTER
Received rx request for Nifedipine, allopurinol, losartan, metformin, furosemide, spironolactone, clopidogrel, isosorbide, metformin, simvastatin. Per Dr. Corbin Yap,     [address] [place] [address]!!    Patient was no show for her last visit January 31 2018.

## 2018-03-05 RX ORDER — NIFEDIPINE 90 MG/1
90 TABLET, EXTENDED RELEASE ORAL DAILY
Qty: 90 TAB | Refills: 0 | Status: SHIPPED | OUTPATIENT
Start: 2018-03-05 | End: 2018-07-10 | Stop reason: SDUPTHER

## 2018-03-05 RX ORDER — SIMVASTATIN 40 MG/1
40 TABLET, FILM COATED ORAL
Qty: 90 TAB | Refills: 1 | Status: SHIPPED | OUTPATIENT
Start: 2018-03-05

## 2018-03-05 NOTE — TELEPHONE ENCOUNTER
Patient called and scheduled an appt for Thursday 03/15/2018 @ 245pm. Patient requesting that one rx of the following meds be sent to [facility]. I advised that when you havent been seen in office only short supplies of meds are usually sent. She stated understanding and is asking for an exception because she is no longer able to use [facility] and must use [facility] per her insurance provider.

## 2018-03-07 DIAGNOSIS — M10.9 GOUT, UNSPECIFIED CAUSE, UNSPECIFIED CHRONICITY, UNSPECIFIED SITE: ICD-10-CM

## 2018-03-07 RX ORDER — SPIRONOLACTONE 25 MG/1
25 TABLET ORAL DAILY
Qty: 90 TAB | Refills: 0 | Status: SHIPPED | OUTPATIENT
Start: 2018-03-07 | End: 2018-08-27 | Stop reason: SDUPTHER

## 2018-03-07 RX ORDER — CLOPIDOGREL BISULFATE 75 MG/1
75 TABLET ORAL DAILY
Qty: 90 TAB | Refills: 0 | Status: SHIPPED | OUTPATIENT
Start: 2018-03-07

## 2018-03-07 RX ORDER — FUROSEMIDE 20 MG/1
TABLET ORAL
Qty: 90 TAB | Refills: 0 | Status: SHIPPED | OUTPATIENT
Start: 2018-03-07 | End: 2018-08-27 | Stop reason: SDUPTHER

## 2018-03-07 RX ORDER — METFORMIN HYDROCHLORIDE 1000 MG/1
1000 TABLET ORAL 2 TIMES DAILY WITH MEALS
Qty: 180 TAB | Refills: 0 | Status: SHIPPED | OUTPATIENT
Start: 2018-03-07

## 2018-03-07 RX ORDER — ISOSORBIDE MONONITRATE 60 MG/1
60 TABLET, EXTENDED RELEASE ORAL DAILY
Qty: 90 TAB | Refills: 0 | Status: SHIPPED | OUTPATIENT
Start: 2018-03-07 | End: 2018-04-26 | Stop reason: SDUPTHER

## 2018-03-07 RX ORDER — ALLOPURINOL 300 MG/1
300 TABLET ORAL DAILY
Qty: 90 TAB | Refills: 0 | Status: SHIPPED | OUTPATIENT
Start: 2018-03-07

## 2018-03-07 RX ORDER — LOSARTAN POTASSIUM 100 MG/1
TABLET ORAL
Qty: 90 TAB | Refills: 0 | Status: SHIPPED | OUTPATIENT
Start: 2018-03-07 | End: 2018-06-22 | Stop reason: SDUPTHER

## 2018-03-07 NOTE — TELEPHONE ENCOUNTER
Dr. [facility], please see refill request for patient, thank you! Requested Prescriptions     Pending Prescriptions Disp Refills    furosemide (LASIX) 20 mg tablet 90 Tab 1    clopidogrel (PLAVIX) 75 mg tab 90 Tab 1     Sig: Take 1 Tab by mouth daily.  spironolactone (ALDACTONE) 25 mg tablet 90 Tab 1     Sig: Take 1 Tab by mouth daily.  isosorbide mononitrate ER (IMDUR) 60 mg CR tablet 90 Tab 1     Sig: Take 1 Tab by mouth.  metFORMIN (GLUCOPHAGE) 1,000 mg tablet 180 Tab 1     Sig: Take 1 Tab by mouth two (2) times daily (with meals).  allopurinol (ZYLOPRIM) 300 mg tablet 90 Tab 1     Sig: Take 1 Tab by mouth daily.     losartan (COZAAR) 100 mg tablet 90 Tab 1     Sig: take 1 tablet by mouth once daily

## 2018-03-07 NOTE — TELEPHONE ENCOUNTER
Pt calling to request medication refill of:    Requested Prescriptions     Pending Prescriptions Disp Refills    furosemide (LASIX) 20 mg tablet 90 Tab 1    clopidogrel (PLAVIX) 75 mg tab 90 Tab 1     Sig: Take 1 Tab by mouth daily.  spironolactone (ALDACTONE) 25 mg tablet 90 Tab 1     Sig: Take 1 Tab by mouth daily.  isosorbide mononitrate ER (IMDUR) 60 mg CR tablet 90 Tab 1     Sig: Take 1 Tab by mouth.  metFORMIN (GLUCOPHAGE) 1,000 mg tablet 180 Tab 1     Sig: Take 1 Tab by mouth two (2) times daily (with meals).  allopurinol (ZYLOPRIM) 300 mg tablet 90 Tab 1     Sig: Take 1 Tab by mouth daily.  losartan (COZAAR) 100 mg tablet 90 Tab 1     Sig: take 1 tablet by mouth once daily     Patient's Pharmacy has to be changed to [facility]. be sent to [facility]. Pts last appt was 10/12/17, next appt sched for 3/15/18. Advised pt of 72 hour time frame for refill requests. Please advise.

## 2018-04-17 RX ORDER — LIRAGLUTIDE 6 MG/ML
INJECTION SUBCUTANEOUS
Qty: 9 ML | Refills: 0 | Status: SHIPPED | OUTPATIENT
Start: 2018-04-17 | End: 2018-04-26 | Stop reason: DRUGHIGH

## 2018-04-26 ENCOUNTER — OFFICE VISIT (OUTPATIENT)
Dept: FAMILY MEDICINE CLINIC | Age: 58
End: 2018-04-26

## 2018-04-26 ENCOUNTER — HOSPITAL ENCOUNTER (OUTPATIENT)
Dept: LAB | Age: 58
Discharge: HOME | End: 2018-04-26
Payer: MEDICARE

## 2018-04-26 VITALS
HEIGHT: 65 IN | DIASTOLIC BLOOD PRESSURE: 78 MMHG | HEART RATE: 73 BPM | TEMPERATURE: 99 F | BODY MASS INDEX: 48.82 KG/M2 | SYSTOLIC BLOOD PRESSURE: 120 MMHG | WEIGHT: 293 LBS | OXYGEN SATURATION: 96 % | RESPIRATION RATE: 16 BRPM

## 2018-04-26 DIAGNOSIS — R20.0 NUMBNESS: ICD-10-CM

## 2018-04-26 DIAGNOSIS — E11.21 TYPE 2 DIABETES WITH NEPHROPATHY (HCC): Primary | ICD-10-CM

## 2018-04-26 DIAGNOSIS — I10 ESSENTIAL HYPERTENSION: ICD-10-CM

## 2018-04-26 DIAGNOSIS — E66.01 OBESITY, MORBID (HCC): ICD-10-CM

## 2018-04-26 DIAGNOSIS — D64.9 ANEMIA, UNSPECIFIED TYPE: ICD-10-CM

## 2018-04-26 DIAGNOSIS — E11.21 TYPE 2 DIABETES WITH NEPHROPATHY (HCC): ICD-10-CM

## 2018-04-26 DIAGNOSIS — E78.00 HYPERCHOLESTEROLEMIA: ICD-10-CM

## 2018-04-26 DIAGNOSIS — M79.671 RIGHT FOOT PAIN: ICD-10-CM

## 2018-04-26 PROBLEM — E11.40 TYPE 2 DIABETES MELLITUS WITH DIABETIC NEUROPATHY (HCC): Status: ACTIVE | Noted: 2018-04-26

## 2018-04-26 LAB
ALBUMIN SERPL-MCNC: 3.3 G/DL (ref 3.4–5)
ALBUMIN/GLOB SERPL: 0.9 {RATIO} (ref 0.8–1.7)
ALP SERPL-CCNC: 68 U/L (ref 45–117)
ALT SERPL-CCNC: 12 U/L (ref 13–56)
ANION GAP SERPL CALC-SCNC: 9 MMOL/L (ref 3–18)
AST SERPL-CCNC: 10 U/L (ref 15–37)
BASOPHILS # BLD: 0 K/UL (ref 0–0.06)
BASOPHILS NFR BLD: 0 % (ref 0–2)
BILIRUB SERPL-MCNC: 0.3 MG/DL (ref 0.2–1)
BUN SERPL-MCNC: 17 MG/DL (ref 7–18)
BUN/CREAT SERPL: 14 (ref 12–20)
CALCIUM SERPL-MCNC: 8.5 MG/DL (ref 8.5–10.1)
CHLORIDE SERPL-SCNC: 108 MMOL/L (ref 100–108)
CHOLEST SERPL-MCNC: 99 MG/DL
CO2 SERPL-SCNC: 23 MMOL/L (ref 21–32)
CREAT SERPL-MCNC: 1.22 MG/DL (ref 0.6–1.3)
DIFFERENTIAL METHOD BLD: ABNORMAL
EOSINOPHIL # BLD: 0.2 K/UL (ref 0–0.4)
EOSINOPHIL NFR BLD: 2 % (ref 0–5)
ERYTHROCYTE [DISTWIDTH] IN BLOOD BY AUTOMATED COUNT: 22.4 % (ref 11.6–14.5)
EST. AVERAGE GLUCOSE BLD GHB EST-MCNC: 117 MG/DL
GLOBULIN SER CALC-MCNC: 3.7 G/DL (ref 2–4)
GLUCOSE SERPL-MCNC: 130 MG/DL (ref 74–99)
HBA1C MFR BLD: 5.7 % (ref 4.2–5.6)
HCT VFR BLD AUTO: 26.8 % (ref 35–45)
HDLC SERPL-MCNC: 40 MG/DL (ref 40–60)
HDLC SERPL: 2.5 {RATIO} (ref 0–5)
HGB BLD-MCNC: 7.4 G/DL (ref 12–16)
IRON SATN MFR SERPL: 4 %
IRON SERPL-MCNC: 15 UG/DL (ref 50–175)
LDLC SERPL CALC-MCNC: 40.4 MG/DL (ref 0–100)
LIPID PROFILE,FLP: NORMAL
LYMPHOCYTES # BLD: 2.4 K/UL (ref 0.9–3.6)
LYMPHOCYTES NFR BLD: 30 % (ref 21–52)
MCH RBC QN AUTO: 16.4 PG (ref 24–34)
MCHC RBC AUTO-ENTMCNC: 27.6 G/DL (ref 31–37)
MCV RBC AUTO: 59.4 FL (ref 74–97)
MONOCYTES # BLD: 0.3 K/UL (ref 0.05–1.2)
MONOCYTES NFR BLD: 4 % (ref 3–10)
NEUTS SEG # BLD: 5.3 K/UL (ref 1.8–8)
NEUTS SEG NFR BLD: 64 % (ref 40–73)
PLATELET # BLD AUTO: 316 K/UL (ref 135–420)
POTASSIUM SERPL-SCNC: 4.6 MMOL/L (ref 3.5–5.5)
PROT SERPL-MCNC: 7 G/DL (ref 6.4–8.2)
RBC # BLD AUTO: 4.51 M/UL (ref 4.2–5.3)
SODIUM SERPL-SCNC: 140 MMOL/L (ref 136–145)
TIBC SERPL-MCNC: 400 UG/DL (ref 250–450)
TRIGL SERPL-MCNC: 93 MG/DL (ref ?–150)
VIT B12 SERPL-MCNC: 195 PG/ML (ref 211–911)
VLDLC SERPL CALC-MCNC: 18.6 MG/DL
WBC # BLD AUTO: 8.2 K/UL (ref 4.6–13.2)

## 2018-04-26 PROCEDURE — 83036 HEMOGLOBIN GLYCOSYLATED A1C: CPT | Performed by: INTERNAL MEDICINE

## 2018-04-26 PROCEDURE — 83540 ASSAY OF IRON: CPT | Performed by: INTERNAL MEDICINE

## 2018-04-26 PROCEDURE — 80061 LIPID PANEL: CPT | Performed by: INTERNAL MEDICINE

## 2018-04-26 PROCEDURE — 82607 VITAMIN B-12: CPT | Performed by: INTERNAL MEDICINE

## 2018-04-26 PROCEDURE — 80053 COMPREHEN METABOLIC PANEL: CPT | Performed by: INTERNAL MEDICINE

## 2018-04-26 PROCEDURE — 36415 COLL VENOUS BLD VENIPUNCTURE: CPT | Performed by: INTERNAL MEDICINE

## 2018-04-26 PROCEDURE — 85025 COMPLETE CBC W/AUTO DIFF WBC: CPT | Performed by: INTERNAL MEDICINE

## 2018-04-26 PROCEDURE — 83021 HEMOGLOBIN CHROMOTOGRAPHY: CPT | Performed by: INTERNAL MEDICINE

## 2018-04-26 RX ORDER — CARVEDILOL 25 MG/1
50 TABLET ORAL 2 TIMES DAILY
Qty: 360 TAB | Refills: 1 | Status: SHIPPED | OUTPATIENT
Start: 2018-04-26

## 2018-04-26 NOTE — PROGRESS NOTES
Meg Plaza is a 62 y.o. female presents to office for follow up DM, HTN, Anemia and hyperlipidemia.           Health Maintenance items with a due date reviewed with patient:  Health Maintenance Due   Topic Date Due    PAP AKA CERVICAL CYTOLOGY  05/05/2016    EYE EXAM RETINAL OR DILATED Q1  09/05/2016    Influenza Age 5 to Adult  08/01/2017    BREAST CANCER SCRN MAMMOGRAM  09/02/2017    FOBT Q 1 YEAR AGE 50-75  02/07/2018    MEDICARE YEARLY EXAM  03/14/2018    HEMOGLOBIN A1C Q6M  04/12/2018

## 2018-04-26 NOTE — PROGRESS NOTES
HISTORY OF PRESENT ILLNESS  Lou Mckeon is a 62 y.o. female here for follow-up on diabetes hypertension hyperlipidemia anemia foot pain and numbness in right great toe. Patient states the foot pain has gone away. Her fasting blood sugar today was 89. Postprandial blood sugars are between 150 and 180. She saw her foot doctor who said that her numbness and pain were coming from a problem with her back. She has a history of spinal stenosis with what sounds like degenerative joint disease. She has a history of chronic low back pain. She is anticipating moving to [facility] where she will resume follow-up. .  Diabetes   The history is provided by the patient and medical records. This is a chronic problem. The problem has been gradually improving. Pertinent negatives include no chest pain, no abdominal pain, no headaches and no shortness of breath. The symptoms are aggravated by eating. The symptoms are relieved by medications. Treatments tried: Metformin insulin  and Victoza. The treatment provided significant relief. Cholesterol Problem   The history is provided by the patient and medical records. This is a chronic problem. The problem has been gradually improving. Pertinent negatives include no chest pain, no abdominal pain, no headaches and no shortness of breath. The symptoms are aggravated by eating. The symptoms are relieved by medications. Treatments tried: Zocor. The treatment provided significant relief. Hypertension    The history is provided by the patient and medical records. This is a chronic problem. The problem has been gradually improving. Pertinent negatives include no chest pain, no orthopnea, no confusion, no headaches, no peripheral edema, no dizziness and no shortness of breath. There are no associated agents to hypertension. Risk factors include diabetes mellitus, dyslipidemia, obesity and postmenopause.    Anemia   The history is provided by the patient and medical records. The problem has not changed since onset. Pertinent negatives include no chest pain, no abdominal pain, no headaches and no shortness of breath. Nothing aggravates the symptoms. Nothing relieves the symptoms. She has tried nothing for the symptoms. Morbid Obesity   The history is provided by the patient and medical records. This is a chronic problem. The problem has not changed since onset. Pertinent negatives include no chest pain, no abdominal pain, no headaches and no shortness of breath. The symptoms are aggravated by eating. Nothing relieves the symptoms. She has tried nothing for the symptoms. Foot Pain   The history is provided by the patient and medical records. This is a chronic problem. The problem has been resolved. Pertinent negatives include no chest pain, no abdominal pain, no headaches and no shortness of breath. The symptoms are aggravated by standing. Nothing relieves the symptoms. She has tried nothing for the symptoms. Numbness   The history is provided by the patient (Patient is complaining of numbness of the right great toe). This is a new problem. The problem has not changed since onset. There was right lower extremity focality noted. Pertinent negatives include no focal weakness, no slurred speech, no speech difficulty, no memory loss, no movement disorder, no agitation, no visual change, no mental status change, no unresponsiveness and no disorientation. There has been no fever. Pertinent negatives include no shortness of breath, no chest pain, no altered mental status, no confusion, no headaches, no bowel incontinence and no bladder incontinence. Allergies   Allergen Reactions    Other Plant, Animal, Environmental Runny Nose and Sneezing     Current Outpatient Prescriptions on File Prior to Visit   Medication Sig Dispense Refill    furosemide (LASIX) 20 mg tablet take 1 tablet by mouth once daily 90 Tab 0    clopidogrel (PLAVIX) 75 mg tab Take 1 Tab by mouth daily.  90 Tab 0  spironolactone (ALDACTONE) 25 mg tablet Take 1 Tab by mouth daily. 90 Tab 0    metFORMIN (GLUCOPHAGE) 1,000 mg tablet Take 1 Tab by mouth two (2) times daily (with meals). 180 Tab 0    allopurinol (ZYLOPRIM) 300 mg tablet Take 1 Tab by mouth daily. 90 Tab 0    losartan (COZAAR) 100 mg tablet take 1 tablet by mouth once daily 90 Tab 0    glucose blood VI test strips (ACCU-CHEK SMARTVIEW TEST STRIP) strip USE TO TEST BLOOD SUGAR FOUR TIMES DAILY 300 Strip 0    NIFEdipine ER (PROCARDIA XL) 90 mg ER tablet Take 1 Tab by mouth daily for 90 days. take 1 tablet by mouth once daily 90 Tab 0    simvastatin (ZOCOR) 40 mg tablet Take 1 Tab by mouth nightly. 90 Tab 1    DULoxetine (CYMBALTA) 60 mg capsule Take 1 Cap by mouth daily. 60mg q AM and 30mg QHS 30 Cap 6    isosorbide mononitrate ER (IMDUR) 60 mg CR tablet take 1 tablet by mouth every morning 30 Tab 3    Insulin Needles, Disposable, (BD INSULIN PEN NEEDLE UF SHORT) 31 gauge x 5/16\" ndle by SubCUTAneous route three (3) times daily. 300 Pen Needle 1    Lancets (ACCU-CHEK MULTICLIX LANCET) misc USE TO TEST BLOOD GLUCOSE FOUR TIMES DAILY 400 Each 1    insulin glargine (LANTUS SOLOSTAR) 100 unit/mL (3 mL) inpn Inject 32 units once daily 15 mL 6    Blood-Glucose Meter (ACCU-CHEK HIRA) misc Use to test blood sugar 4 times daily 1 Each 0    CARVEDILOL (COREG PO) Take 50 mg by mouth two (2) times a day.  Liraglutide (VICTOZA) 0.6 mg/0.1 mL (18 mg/3 mL) sub-q pen 1.8 mg by SubCUTAneous route.  gabapentin (NEURONTIN) 300 mg capsule Take 300 mg by mouth three (3) times daily.  timolol (TIMOPTIC-XE) 0.5 % ophthalmic gel-forming Administer 1 Drop to both eyes daily.  BD SINGLE USE SWABS REGULAR padm USE AS DIRECTED 300 Pad 2    aspirin (BABY ASPIRIN) 81 mg chewable tablet Take 1 tablet by mouth daily. 90 tablet 3    Lancets (PRODIGY LANCETS) misc Tests qid 250.02 3 Package 3    multivitamin (ONE A DAY) tablet Take 1 tablet by mouth daily.  L-Mfolate-B6 Phos-Methyl-B12 (METANX) 3-35-2 mg tab tab Take 1 Tab by mouth daily. 90 Tab 1    L-Mfolate-B6 Phos-Methyl-B12 (L-METHYL-B6-B12) 3-35-2 mg tab tab Take 1 Tab by mouth daily. 90 Tab 1    levomefolate-B6-meB12-algal oil 3 mg-35 mg-2 mg -90.314 mg cap Take 1 Cap by mouth daily. 90 Cap 1    INVOKANA 300 mg tablet take 1 tablet once daily before BREAKFAST 30 Tab 3     No current facility-administered medications on file prior to visit. Past Medical History:   Diagnosis Date    Anemia NEC     Arthritis     Chronic kidney disease     Congestive heart failure, unspecified     [place]    CRI (chronic renal insufficiency)     [place]    Diabetes ([facility] [address]     eye- [place]    Fibromyalgia     Gout     H/O colonoscopy     H/O mammogram     2013    History of blood transfusion     Hypercholesterolemia     Hypertension     PCOS (polycystic ovarian syndrome)     Peripheral vascular disease ([facility] [address]     [facility]    Stenosis of lumbosacral spine     Stroke [facility]) 1991    had dysarthria    Vitamin D deficiency      Past Surgical History:   Procedure Laterality Date    HX PTCA  2012    HX TONSILLECTOMY  1981    [facility] US ASP BREAST CYST RT      STENT INSERTION Right 2012    right iliac     Family History   Problem Relation Age of Onset    Diabetes Mother     Hypertension Mother     Dementia Mother     Other Mother      aneurysm     Cancer Father      LUNG    Colon Cancer Sister          Diabetes Sister     Hypertension Sister     Diabetes Brother     Hypertension Brother          Review of Systems   Constitutional: Negative. Eyes: Negative. Respiratory: Negative. Negative for shortness of breath. Cardiovascular: Negative. Negative for chest pain and orthopnea. Gastrointestinal: Negative for abdominal pain and bowel incontinence. Genitourinary: Negative for bladder incontinence.    Musculoskeletal: Negative. Neurological: Positive for numbness. Negative for dizziness, focal weakness, speech difficulty and headaches. Endo/Heme/Allergies: Negative. Psychiatric/Behavioral: Negative. Negative for agitation, confusion and memory loss. Visit Vitals    /78 (BP 1 Location: Right arm, BP Patient Position: Sitting)    Pulse 73    Temp 99 °F (37.2 °C) (Oral)    Resp 16    Ht 5' 4.5\" (1.638 m)    Wt 317 lb (143.8 kg)    SpO2 96%    BMI 53.57 kg/m2       Physical Exam   Constitutional: She is oriented to person, place, and time. She appears well-developed and well-nourished. HENT:   Head: Normocephalic and atraumatic. Cardiovascular: Normal rate, regular rhythm, normal heart sounds and intact distal pulses. Exam reveals no gallop and no friction rub. No murmur heard. Pulmonary/Chest: Effort normal and breath sounds normal. No respiratory distress. She has no wheezes. She has no rales. Musculoskeletal: Normal range of motion. She exhibits no edema, tenderness or deformity. Neurological: She is alert and oriented to person, place, and time. No cranial nerve deficit. Coordination normal.   Skin: Skin is warm and dry. No rash noted. No erythema. No pallor. Psychiatric: She has a normal mood and affect. Her behavior is normal. Judgment and thought content normal.   Nursing note and vitals reviewed. ASSESSMENT and PLAN    ICD-10-CM ICD-9-CM    1. Type 2 diabetes with nephropathy (HCC) E11.21 250.40 HEMOGLOBIN A1C WITH EAG     583.81 CBC WITH AUTOMATED DIFF      METABOLIC PANEL, COMPREHENSIVE      MICROALBUMIN, UR, RAND W/ MICROALB/CREAT RATIO      URINALYSIS W/ RFLX MICROSCOPIC   2. Essential hypertension Y22 712.8 METABOLIC PANEL, COMPREHENSIVE   3. Anemia, unspecified type D64.9 285.9 VITAMIN B12      IRON PROFILE      HEMOGLOBIN FRACTIONATION      REFERRAL TO GYNECOLOGY   4.  Hypercholesterolemia S15.76 005.1 METABOLIC PANEL, COMPREHENSIVE      LIPID PANEL   5. Obesity, morbid ([facility] E66.01 278.01    6. Right foot pain M79.671 729.5 MRI LUMB SPINE WO CONT   7. Numbness R20.0 782.0 MRI LUMB SPINE WO CONT     Follow-up Disposition:  Return in about 2 weeks (around 5/10/2018).

## 2018-04-30 LAB
DEPRECATED HGB OTHER BLD-IMP: 0 %
HGB A MFR BLD: 98.4 % (ref 96.4–98.8)
HGB A2 MFR BLD COLUMN CHROM: 1.6 % (ref 1.8–3.2)
HGB C MFR BLD: 0 %
HGB F MFR BLD: 0 % (ref 0–2)
HGB FRACT BLD-IMP: ABNORMAL
HGB S BLD QL SOLY: NEGATIVE
HGB S MFR BLD: 0 %

## 2018-05-01 LAB
ALBUMIN/CREAT UR: 77.6 MG/G CREAT (ref 0–30)
APPEARANCE UR: CLEAR
BILIRUB UR QL STRIP: NEGATIVE
COLOR UR: YELLOW
CREAT UR-MCNC: 123.6 MG/DL
GLUCOSE UR QL: NEGATIVE
HGB UR QL STRIP: NEGATIVE
KETONES UR QL STRIP: NEGATIVE
LEUKOCYTE ESTERASE UR QL STRIP: NEGATIVE
MICRO URNS: NORMAL
MICROALBUMIN UR-MCNC: 95.9 UG/ML
NITRITE UR QL STRIP: NEGATIVE
PH UR STRIP: 5 [PH] (ref 5–7.5)
PROT UR QL STRIP: NEGATIVE
SP GR UR: 1.01 (ref 1–1.03)
UROBILINOGEN UR STRIP-MCNC: 0.2 MG/DL (ref 0.2–1)

## 2018-05-02 RX ORDER — TIMOLOL MALEATE 5 MG/ML
1 SOLUTION OPHTHALMIC DAILY
Qty: 5 ML | OUTPATIENT
Start: 2018-05-02

## 2018-05-02 NOTE — TELEPHONE ENCOUNTER
Dr. Isabela Triplett, please see refill request for patient, thank you! Requested Prescriptions     Pending Prescriptions Disp Refills    timolol (TIMOPTIC-XE) 0.5 % ophthalmic gel-forming 5 mL      Sig: Administer 1 Drop to both eyes daily.

## 2018-05-02 NOTE — TELEPHONE ENCOUNTER
Pt calling to request med refill of:  Requested Prescriptions     Pending Prescriptions Disp Refills    timolol (TIMOPTIC-XE) 0.5 % ophthalmic gel-forming 5 mL      Sig: Administer 1 Drop to both eyes daily. Be sent to [address] [place] [address] [address]    Pt has 0 remaining     Pts last appt was  BLANK & next appt jan for BLANK    Pt advised of 72 hour time frame. Please advise.

## 2018-05-08 ENCOUNTER — TELEPHONE (OUTPATIENT)
Dept: FAMILY MEDICINE CLINIC | Age: 58
End: 2018-05-08

## 2018-05-08 NOTE — TELEPHONE ENCOUNTER
----- Message from Refugio Thrasher MD sent at 5/7/2018  5:28 PM EDT -----  Patient has a severe anemia and needs follow-up appointment with me as soon as possible

## 2018-05-10 RX ORDER — TIMOLOL MALEATE 5 MG/ML
1 SOLUTION OPHTHALMIC DAILY
Qty: 5 ML | Refills: 1 | OUTPATIENT
Start: 2018-05-10

## 2018-05-10 NOTE — TELEPHONE ENCOUNTER
Dr. Pravin Wolf, please see refill request for patient, thank you! Requested Prescriptions     Pending Prescriptions Disp Refills    timolol (TIMOPTIC-XE) 0.5 % ophthalmic gel-forming 5 mL 1     Sig: Administer 1 Drop to both eyes daily.

## 2018-06-04 NOTE — TELEPHONE ENCOUNTER
Pt calling to request medication refill of:  Requested Prescriptions     Pending Prescriptions Disp Refills    Liraglutide (VICTOZA) 0.6 mg/0.1 mL (18 mg/3 mL) pnij       Si.8 mg by SubCUTAneous route. be sent to [address], [address]  Pt has about 0 tabs remaining. Pts last appt was   Advised pt of 72 hour time frame for refill requests. Please advise.

## 2018-06-04 NOTE — TELEPHONE ENCOUNTER
Dr. Sai Kaur, please see refill request for patient, thank you! Requested Prescriptions     Pending Prescriptions Disp Refills    Liraglutide (VICTOZA) 0.6 mg/0.1 mL (18 mg/3 mL) pnij 1 Pen 3     Si.8 mg by SubCUTAneous route daily.

## 2018-06-11 RX ORDER — TIMOLOL MALEATE 5 MG/ML
1 SOLUTION/ DROPS OPHTHALMIC 2 TIMES DAILY
Qty: 10 ML | Refills: 2 | OUTPATIENT
Start: 2018-06-11

## 2018-06-11 NOTE — TELEPHONE ENCOUNTER
Pt requesting RX refill(s) for:  Requested Prescriptions     Pending Prescriptions Disp Refills    timolol (TIMOPTIC) 0.5 % ophthalmic solution 10 mL 2     Sig: Administer 1 Drop to both eyes two (2) times a day.        Last refill: [facility]    Last visit: 04/26/18    Thank you

## 2018-06-12 NOTE — TELEPHONE ENCOUNTER
Patient has been called and notified that she will need to get from her opthalmologist. Patient stated \"Well I figured that since I have been on this medication for over 2 years now that my PCP would be able to fill it for me\" I notified patient that Dr. Taylor Garza was not the original prescriber and that her ophthalmologist is the one who will need to manage this medication and that she will need to get it from him. Patient stated \"Well, first of all, its a her. And second of all, this is ridiculous but I guess I will just have to call them to get it. Thanks\" and then hung up the phone. Closing encounter.

## 2018-06-14 ENCOUNTER — PATIENT OUTREACH (OUTPATIENT)
Dept: FAMILY MEDICINE CLINIC | Age: 58
End: 2018-06-14

## 2018-06-14 RX ORDER — INSULIN GLARGINE 100 [IU]/ML
INJECTION, SOLUTION SUBCUTANEOUS
Qty: 15 ML | Refills: 6 | Status: SHIPPED | OUTPATIENT
Start: 2018-06-14

## 2018-06-21 ENCOUNTER — PATIENT OUTREACH (OUTPATIENT)
Dept: FAMILY MEDICINE CLINIC | Age: 58
End: 2018-06-21

## 2018-06-25 RX ORDER — LOSARTAN POTASSIUM 100 MG/1
TABLET ORAL
Qty: 90 TAB | Refills: 0 | Status: SHIPPED | OUTPATIENT
Start: 2018-06-25

## 2018-06-28 NOTE — TELEPHONE ENCOUNTER
DATE OF SERVICE:  6/28/2018     CHIEF COMPLAINT: Follow up for diffuse large B-cell lymphoma, possible continuation of treatment with RCHOP, and discussion of scan results.    PRIMARY CARE PHYSICIAN: Ariane Hayward NP    DIAGNOSIS:  1. Diffuse large B-cell lymphoma, stage 3AS, non GCB type (CD45+, CD20+, CD3-), BCL6+, c-Myc negative, Ki-67 90%. IPI high intermediate, NCCN IPI low intermediate. On RCHOP chemotherapy, cycle 1, day 1 on 02/19/18.  - s/p 6 cycles  2. Endometrial cancer, stage 1B, grade 2, s/p TLH/BSO and lymph node dissection on 01/25/18,  - Brachytherapy start 6/28/2018    HISTORY OF PRESENT ILLNESS:   1. Patient reports she was initially seen by her PCP for evaluation of vaginal bleeding.   2. She was seen by Dr. Williamson (gyn/onc) for evaluation that led to a diagnosis of endometrial cancer, stage IB, grade 2.   3. Patient underwent total laparoscopic hysterectomy, bilateral salpingo oophorectomy, pelvic/aortic lymphadenectomy on 1/25/18.   4. Pathology revealed in addition to the known endometrial cancer and additional incidental finding of diffuse large B-cell lymphoma in the lymph node.   5. Staging PET CT scan performed on 2/8/2018 revealed diffuse hypermetabolic lymphadenopathy both above and below the diaphragm with splenic involvement. No extra lymph node and organ involvement is noted other than the splenic involvement. Fish panel for large cell lymphoma on the pathology specimen reveals BCL 6 but is negative for C Myc and BCL-2. Bone marrow biopsy is negative for involvement. Final stage is stage III AS.  6. Chemotherapy with RCHOP initiated on 2/19/2018.  7. Echo from 4/19/2018 with ejection fraction of 65%.  8. PET from 5/11/2018 showed complete interval resolution of all previously noted hypermetabolic lymph nodes throughout the chest, abdomen, and pelvis. There are residual borderline enlarged lymph nodes in the left inguinal region, although these are not demonstrating hypermetabolic  Opened in error. Closing encounter. Nano Ramon activity.  9. PET from 6/27/2018 showed Normal PET with no suspicious hypermetabolic lymph nodes throughout the chest, abdomen, and pelvis. There are residual borderline enlarged lymph nodes in the left inguinal region, although these are not demonstrating hypermetabolic activity.    INTERVAL HISTORY:   Patient presents to the clinic with her mother after completing 6 cycles of RCHOP. Patient is here for her PET scan results.  Patient denies nausea or vomiting. Patient is in better spirits today than pervious days as she has now completed on therapy for her diagnosis and now will move towards her treatment for her second diagnosis of endometrial cancer.    REVIEW OF SYSTEMS:  12 point ros performed and neg except as mentioned in the above.     PAST MEDICAL HISTORY    Hypertension                                                  Hyperlipidemia                                                Hyperthyroidism                                                SOCIAL HISTORY    Tobacco Use: Never           Alcohol Use: No              ALLERGIES  ALLERGIES: no known allergies.    MEDICATIONS  Current Outpatient Prescriptions   Medication Sig Dispense Refill   • levothyroxine (SYNTHROID, LEVOTHROID) 100 MCG tablet Take 1 tablet by mouth daily. 90 tablet 0   • lisinopril-hydroCHLOROthiazide (PRINZIDE,ZESTORETIC) 20-25 MG per tablet Take 1 tablet by mouth daily. 90 tablet 0   • atorvastatin (LIPITOR) 40 MG tablet Take 1 tablet by mouth daily. 90 tablet 0   • DULoxetine (CYMBALTA) 30 MG capsule Take one capsule by mouth daily for one week, then increase to two capsules by mouth daily. 60 capsule 0   • allopurinol (ZYLOPRIM) 300 MG tablet Take 1 tablet by mouth daily.  30 tablet 3   • aspirin 81 MG tablet Take 81 mg by mouth daily.     • lidocaine-prilocaine (EMLA) cream Apply liberal amount of EMLA cream to port site 30-60 min prior to port access. 30 g 1   • senna (SENOKOT) 8.6 MG tablet Take 1 tablet by mouth daily. 30 tablet 0    • prochlorperazine (COMPAZINE) 10 MG tablet Take 1 tablet by mouth every 6 hours as needed for Nausea or Vomiting. 30 tablet 3   • predniSONE (DELTASONE) 50 MG tablet Take 2 tabs (100mg) daily on days 1-5 each treatment cycle. (Enough prescribed for 6 cycles) 60 tablet 0   • ondansetron (ZOFRAN ODT) 8 MG disintegrating tablet Place 1 tablet onto the tongue every 8 hours as needed for Nausea. 15 tablet 3   • HYDROcodone-acetaminophen (NORCO) 5-325 MG per tablet Take 1 tablet by mouth every 6 hours as needed for Pain. 20 tablet 0   • clobetasol (TEMOVATE) 0.05 % ointment Apply to affected areas on the hands and elbows twice a day for 3 weeks then decrease to 3 times weekly. (Patient taking differently: as needed. Apply to affected areas on the hands and elbows twice a day for 3 weeks then decrease to 3 times weekly.) 30 g 2     No current facility-administered medications for this visit.      Facility-Administered Medications Ordered in Other Visits   Medication Dose Route Frequency Provider Last Rate Last Dose   • heparin flush 100 UNIT/ML lock flush 500 Units  5 mL Intercatheter PRN Jessica Chapman PA-C   500 Units at 06/27/18 0707       PHYSICAL EXAMINATION  Vitals:    06/28/18 1301   BP: (!) 137/94   Pulse: 51   Temp: 98.7 °F (37.1 °C)   TempSrc: Oral   SpO2: 97%   Weight: (!) 137.9 kg   Height: 5' 5\" (1.651 m)   PainSc:  0   ECOG PFS of 1  General:  Well developed, well nourished, no acute distress, non-toxic appearance   Eyes:  Conjunctiva normal   HENT:  Atraumatic, external ears normal, nose normal, oropharynx moist, no pharyngeal exudates. Neck- normal range of motion, no tenderness, supple, alopecia   Respiratory:  No respiratory distress, normal breath sounds, no wheezing.   Cardiovascular:  Normal rate, normal rhythm, no murmurs, no gallops, no rubs   GI:  Soft, nondistended, nontender, no organomegaly, no mass, no rebound, no guarding   :  No costovertebral angle tenderness   Musculoskeletal:  No  edema, no tenderness, no deformities. Back- no tenderness  Integument:  Well hydrated, no rash   Lymphatic:  No lymphadenopathy noted   Neurologic:  Alert & oriented x 3, CN 2-12 normal, normal motor function, no focal deficits noted   Psychiatric:  Speech and behavior appropriate         LABORATORY DATA  WBC (K/mcL)   Date Value   06/27/2018 4.6     RBC (mil/mcL)   Date Value   06/27/2018 3.21 (L)     HCT (%)   Date Value   06/27/2018 30.7 (L)     HGB (g/dL)   Date Value   06/27/2018 9.8 (L)     PLT (K/mcL)   Date Value   06/27/2018 179       Sodium (mmol/L)   Date Value   06/27/2018 140     Potassium (mmol/L)   Date Value   06/27/2018 3.7     Chloride (mmol/L)   Date Value   06/27/2018 103     Glucose (mg/dL)   Date Value   06/27/2018 138 (H)     CALCIUM (mg/dL)   Date Value   06/27/2018 9.5     Carbon Dioxide (mmol/L)   Date Value   06/27/2018 28     BUN (mg/dL)   Date Value   06/27/2018 9     Creatinine (mg/dL)   Date Value   06/27/2018 0.61     ALK PHOSPHATASE (Units/L)   Date Value   06/27/2018 92     AST/SGOT (Units/L)   Date Value   06/27/2018 19     ALT/SGPT (Units/L)   Date Value   06/27/2018 31       IMAGING  Pet Ct Fdg Skull Base To Mid-thighs  Result Date: 5/11/2018  IMPRESSION: Complete interval resolution of all previously noted hypermetabolic lymph nodes throughout the chest, abdomen, and pelvis. Residual borderline enlarged lymph nodes LEFT inguinal region, although not demonstrating hypermetabolic activity. Rectosigmoid colon diverticulosis without acute inflammation. Hyperstimulated marrow response throughout all the visualized osseous structures of the axial and appendicular skeleton.        ASSESSMENT   1. Diffuse large B-cell lymphoma, stage 3AS, non GCB type (CD45+, CD20+, CD3-), BCL6+, c-Myc negative, Ki-67 90%. IPI high intermediate, NCCN IPI low intermediate. Bone marrow biopsy negative. Echo from 2/15/2018 is normal. Initiated chemotherapy with R-CHOP on 2/19/2018. Echocardiogram from  2018 with ejection fraction of 65%.   -  PET from 2018 after 6 cycles RCHOP shows complete remission.   2. Endometrial cancer, stage IB, grade 2, s/p total laparoscopic hysterectomy, bilateral salpingo oophorectomy, pelvic/aortic lymphadenectomy on 18.   - Adjuvant brachytherapy planned to start today.   3. High risk of chemotherapeutic induced neutropenic complications. Received Neulasta support.  4. Anemia, secondary to treatment.  5. Adjustment disorder- improving.       PLAN  1. RT today and weekly x3 weeks.  2. Supportive care.    All labs reviewed. Adequate to proceed with treatment today. Patient stated understanding and agreement. Questions were answered. Follow-up was arranged. Chemotherapy orders performed and doses reviewed with treatment nurse and pharmacist.        FOLLOW UP  Labs (CBC, CMP, LDH) and F/u with Dr. Rangel in 4 weeks      Call the [facility] [phone] at any time for any of the followin.  Fevers > 100.5  2.  Symptoms of infection  3.  Uncontrolled nausea or vomiting  4.  Bleeding events or unexplained bruising.  5.  New or uncontrolled pain  6.  Other unusual symptoms or concerns.    All the patient's questions were answered, I believe, to their satisfaction. Patient would like to move forward with the plan as outlined in the above.     Lastly, I asked 3 questions to the patient:  1. Are you satisfied with the visit and their response was \"yes.\"  2. Do you have any further questions you would like to ask? The answer was \"no.\"  3. Do you feel as though you had adequate time with me, the doctor, during this visit? The response was \"yes.\"

## 2018-08-21 ENCOUNTER — PATIENT OUTREACH (OUTPATIENT)
Dept: FAMILY MEDICINE CLINIC | Age: 58
End: 2018-08-21

## 2018-08-22 ENCOUNTER — PATIENT OUTREACH (OUTPATIENT)
Dept: FAMILY MEDICINE CLINIC | Age: 58
End: 2018-08-22

## 2018-08-22 NOTE — PROGRESS NOTES
[facility] health screening:    Dr. Chante Hoffmann office verified her colonoscopy was done in 2008 and she was due for repeat @ the beginning of this year. I am closing this episode as she is not returning my calls but left my # and will be glad to assist if asked.

## 2018-09-17 DIAGNOSIS — Z12.39 BREAST SCREENING: Primary | ICD-10-CM

## 2022-12-06 NOTE — MR AVS SNAPSHOT
[address] [facility] 
 
 
 [address] [facility] 114 [facility] [address] 
[phone] Patient: Adriano Singh MRN: [id number] MEZ:[id number] Visit Information Date & Time Provider Department Dept. Phone Encounter #  
 4/26/2018  9:30 AM Trudi Hammans, MD [address] [facility] [phone] [id number] Follow-up Instructions Return in about 2 weeks (around 5/10/2018). Follow-up and Disposition History Your Appointments 5/10/2018 11:45 AM  
Office Visit with Trudi Hammans, MD  
[facility]) Appt Note: 2 week f/u from 4/26/18  
 120 [facility] [address] [place] [address]  
[phone]  
  
   
 [address] [place] [id number] Upcoming Health Maintenance Date Due  
 PAP AKA CERVICAL CYTOLOGY 5/5/2016 EYE EXAM RETINAL OR DILATED Q1 9/5/2016 Influenza Age 5 to Adult 8/1/2017 BREAST CANCER SCRN MAMMOGRAM 9/2/2017 FOBT Q 1 YEAR AGE 50-75 2/7/2018 MEDICARE YEARLY EXAM 3/14/2018 HEMOGLOBIN A1C Q6M 4/12/2018 LIPID PANEL Q1 10/12/2018 FOOT EXAM Q1 10/13/2018 MICROALBUMIN Q1 10/13/2018 DTaP/Tdap/Td series (2 - Td) 2/7/2027 Allergies as of 4/26/2018  Review Complete On: 4/26/2018 By: Trudi Hammans, MD  
  
 Severity Noted Reaction Type Reactions Other Plant, Animal, Environmental  06/24/2015    Runny Nose, Sneezing Current Immunizations  Reviewed on 2/7/2017 Name Date Influenza Vaccine 9/16/2014 11:53 AM, 12/30/2013  8:50 AM, 11/10/2011, 10/12/2010 Pneumococcal Polysaccharide (PPSV-23) 2/7/2017, 10/12/2010 Tdap 2/7/2017 Not reviewed this visit You Were Diagnosed With   
  
 Codes Comments Type 2 diabetes with nephropathy (HCC)    -  Primary ICD-10-CM: E11.21 
ICD-9-CM: 250.40, 583.81 Essential hypertension     ICD-10-CM: I10 
ICD-9-CM: 401.9 [FreeTextEntry1] : LF gently reduced and fingers christina taped, application of fiberglass ulnar gutter splint with slight flexion of fingers, wrapped with ace bandage. Cast care instructions given, elevation and given sling for comfort, but not continuously. May take Tylenol for pain. Discussed possible surgery if loss of reduction. FU one week for repeat xrays.  Anemia, unspecified type     ICD-10-CM: D64.9 ICD-9-CM: 285.9 Hypercholesterolemia     ICD-10-CM: E78.00 ICD-9-CM: 272.0 Obesity, morbid ([address]     ICD-10-CM: E66.01 
ICD-9-CM: 278.01 Right foot pain     ICD-10-CM: M79.671 ICD-9-CM: 729.5 Numbness     ICD-10-CM: R20.0 ICD-9-CM: 807. 0 Vitals BP Pulse Temp Resp Height(growth percentile) Weight(growth percentile) 120/78 (BP 1 Location: Right arm, BP Patient Position: Sitting) 73 99 °F (37.2 °C) (Oral) 16 5' 4.5\" (1.638 m) 317 lb (143.8 kg) SpO2 BMI OB Status Smoking Status 96% 53.57 kg/m2 Having regular periods Former Smoker Vitals History BMI and BSA Data Body Mass Index Body Surface Area  
 53.57 kg/m 2 2.56 m 2 Preferred Pharmacy Pharmacy Name Phone [address] [phone] Your Updated Medication List  
  
   
This list is accurate as of 4/26/18 10:35 AM.  Always use your most recent med list.  
  
  
  
  
 allopurinol 300 mg tablet Commonly known as:  Ardia Erps Take 1 Tab by mouth daily. aspirin 81 mg chewable tablet Commonly known as:  BABY ASPIRIN Take 1 tablet by mouth daily. BD Single Use Swabs Regular Padm Generic drug:  alcohol swabs USE AS DIRECTED Blood-Glucose Meter Misc Commonly known as:  Beula Larch Use to test blood sugar 4 times daily  
  
 carvedilol 25 mg tablet Commonly known as:  Jayson Heal Take 2 Tabs by mouth two (2) times a day. clopidogrel 75 mg Tab Commonly known as:  PLAVIX Take 1 Tab by mouth daily. DULoxetine 60 mg capsule Commonly known as:  CYMBALTA Take 1 Cap by mouth daily. 60mg q AM and 30mg QHS  
  
 furosemide 20 mg tablet Commonly known as:  LASIX  
take 1 tablet by mouth once daily  
  
 gabapentin 300 mg capsule Commonly known as:  NEURONTIN Take 300 mg by mouth three (3) times daily. glucose blood VI test strips strip Commonly known as:  ACCU-CHEK SMARTVIEW TEST STRIP  
USE TO TEST BLOOD SUGAR FOUR TIMES DAILY  
  
 insulin glargine 100 unit/mL (3 mL) Inpn Commonly known as:  LANTUS SOLOSTAR U-100 INSULIN Inject 32 units once daily Insulin Needles (Disposable) 31 gauge x 5/16\" Ndle Commonly known as:  BD ULTRA-FINE SHORT PEN NEEDLE  
by SubCUTAneous route three (3) times daily. INVOKANA 300 mg tablet Generic drug:  canagliflozin  
take 1 tablet once daily before BREAKFAST  
  
 isosorbide mononitrate ER 60 mg CR tablet Commonly known as:  IMDUR  
take 1 tablet by mouth every morning * L-Mfolate-B6 Phos-Methyl-B12 3-35-2 mg Tab tab Commonly known as:  L-METHYL-B6-B12 Take 1 Tab by mouth daily. * L-Mfolate-B6 Phos-Methyl-B12 3-35-2 mg Tab tab Commonly known as:  Yisel Jenny Take 1 Tab by mouth daily. * Lancets Misc Commonly known as:  PRODIGY LANCETS Tests qid 250.02 * Lancets Misc Commonly known as:  ACCU-CHEK MULTICLIX LANCET  
USE TO TEST BLOOD GLUCOSE FOUR TIMES DAILY  
  
 levomefolate-B6-meB12-algal oil 3 mg-35 mg-2 mg -90.314 mg Cap Take 1 Cap by mouth daily. * Liraglutide 0.6 mg/0.1 mL (18 mg/3 mL) Pnij Commonly known as:  VICTOZA  
1.8 mg by SubCUTAneous route. * Liraglutide 0.6 mg/0.1 mL (18 mg/3 mL) Pnij Commonly known as:  VICTOZA  
1.8 mg by SubCUTAneous route. losartan 100 mg tablet Commonly known as:  COZAAR  
take 1 tablet by mouth once daily  
  
 metFORMIN 1,000 mg tablet Commonly known as:  GLUCOPHAGE Take 1 Tab by mouth two (2) times daily (with meals). multivitamin tablet Commonly known as:  ONE A DAY Take 1 tablet by mouth daily. NIFEdipine ER 90 mg ER tablet Commonly known as:  PROCARDIA XL Take 1 Tab by mouth daily for 90 days. take 1 tablet by mouth once daily  
  
 simvastatin 40 mg tablet Commonly known as:  ZOCOR Take 1 Tab by mouth nightly. spironolactone 25 mg tablet Commonly known as:  ALDACTONE Take 1 Tab by mouth daily. timolol 0.5 % ophthalmic gel-forming Commonly known as:  TIMOPTIC-XE Administer 1 Drop to both eyes daily. * Notice: This list has 6 medication(s) that are the same as other medications prescribed for you. Read the directions carefully, and ask your doctor or other care provider to review them with you. Prescriptions Sent to Pharmacy Refills  
 carvedilol (COREG) 25 mg tablet 1 Sig: Take 2 Tabs by mouth two (2) times a day. Class: Normal  
 Pharmacy: [address] [place] [address], [address] [place] [address] #: [phone] Route: Oral  
  
We Performed the Following REFERRAL TO GYNECOLOGY [REF30 Custom] Comments:  
 Patient age 62 with menorrhagia every 3-4 months. Patient to call with name of gynecologist she wants to see Follow-up Instructions Return in about 2 weeks (around 5/10/2018). To-Do List   
 04/26/2018 Imaging:  MRI LUMB SPINE WO CONT Referral Information Referral ID Referred By Referred To  
  
 [id number] Cecile LO Not Available Visits Status Start Date End Date 1 New Request 4/26/18 4/26/19 If your referral has a status of pending review or denied, additional information will be sent to support the outcome of this decision. Referral ID Referred By Referred To  
 [id number] Cecile LO Not Available Visits Status Start Date End Date 1 New Request 4/26/18 4/26/19 If your referral has a status of pending review or denied, additional information will be sent to support the outcome of this decision. Introducing [facility]! Dear Katarzyna Bangura: Thank you for requesting a [employer] account. Our records indicate that you already have an active [employer] account. You can access your account anytime at [email or web address] Did you know that you can access your hospital and ER discharge instructions at any time in [employer]? You can also review all of your test results from your hospital stay or ER visit. Additional Information If you have questions, please visit the Frequently Asked Questions section of the [employer] website at [email or web address] Remember, [employer] is NOT to be used for urgent needs. For medical emergencies, dial 911. Now available from your iPhone and Android! Please provide this summary of care documentation to your next provider. Your primary care clinician is listed as Marc Caruso. If you have any questions after today's visit, please call [phone].